# Patient Record
Sex: MALE | Race: WHITE | NOT HISPANIC OR LATINO | Employment: FULL TIME | URBAN - METROPOLITAN AREA
[De-identification: names, ages, dates, MRNs, and addresses within clinical notes are randomized per-mention and may not be internally consistent; named-entity substitution may affect disease eponyms.]

---

## 2021-04-20 ENCOUNTER — OCCMED (OUTPATIENT)
Dept: URGENT CARE | Facility: CLINIC | Age: 21
End: 2021-04-20
Payer: OTHER MISCELLANEOUS

## 2021-04-20 ENCOUNTER — APPOINTMENT (OUTPATIENT)
Dept: RADIOLOGY | Facility: CLINIC | Age: 21
End: 2021-04-20
Payer: OTHER MISCELLANEOUS

## 2021-04-20 DIAGNOSIS — M25.572 ACUTE LEFT ANKLE PAIN: Primary | ICD-10-CM

## 2021-04-20 DIAGNOSIS — M25.572 ACUTE LEFT ANKLE PAIN: ICD-10-CM

## 2021-04-20 PROCEDURE — 73630 X-RAY EXAM OF FOOT: CPT

## 2021-04-20 PROCEDURE — 99283 EMERGENCY DEPT VISIT LOW MDM: CPT | Performed by: FAMILY MEDICINE

## 2021-04-20 PROCEDURE — 73610 X-RAY EXAM OF ANKLE: CPT

## 2021-04-20 PROCEDURE — G0382 LEV 3 HOSP TYPE B ED VISIT: HCPCS | Performed by: FAMILY MEDICINE

## 2022-02-02 ENCOUNTER — OFFICE VISIT (OUTPATIENT)
Dept: URGENT CARE | Facility: CLINIC | Age: 22
End: 2022-02-02
Payer: COMMERCIAL

## 2022-02-02 VITALS
DIASTOLIC BLOOD PRESSURE: 74 MMHG | HEART RATE: 99 BPM | OXYGEN SATURATION: 97 % | SYSTOLIC BLOOD PRESSURE: 125 MMHG | WEIGHT: 257 LBS | RESPIRATION RATE: 16 BRPM

## 2022-02-02 DIAGNOSIS — M62.830 LUMBAR PARASPINAL MUSCLE SPASM: Primary | ICD-10-CM

## 2022-02-02 PROCEDURE — G0382 LEV 3 HOSP TYPE B ED VISIT: HCPCS | Performed by: FAMILY MEDICINE

## 2022-02-02 RX ORDER — ESTRADIOL 2 MG/1
2 TABLET ORAL
COMMUNITY
Start: 2022-01-26

## 2022-02-02 RX ORDER — SPIRONOLACTONE 50 MG/1
50 TABLET, FILM COATED ORAL 2 TIMES DAILY
COMMUNITY
Start: 2022-01-26

## 2022-02-02 RX ORDER — CYCLOBENZAPRINE HCL 10 MG
10 TABLET ORAL 3 TIMES DAILY PRN
Qty: 30 TABLET | Refills: 0 | Status: SHIPPED | OUTPATIENT
Start: 2022-02-02 | End: 2022-03-22 | Stop reason: HOSPADM

## 2022-02-02 RX ORDER — MELOXICAM 7.5 MG/1
7.5 TABLET ORAL DAILY
Qty: 60 TABLET | Refills: 0 | Status: SHIPPED | OUTPATIENT
Start: 2022-02-02 | End: 2022-03-22 | Stop reason: HOSPADM

## 2022-02-02 RX ORDER — PROGESTERONE 100 MG/1
1 CAPSULE ORAL EVERY EVENING
COMMUNITY
Start: 2022-01-26

## 2022-02-02 NOTE — PROGRESS NOTES
[facility]        NAME: Ursula Marin is a 25 y o  male  : 2000    MRN: [id number]  DATE: 2022  TIME: 6:33 PM    Assessment and Plan   Lumbar paraspinal muscle spasm [M62 830]  1  Lumbar paraspinal muscle spasm  cyclobenzaprine (FLEXERIL) 10 mg tablet    meloxicam (MOBIC) 7 5 mg tablet         Patient Instructions       Follow up with PCP in 3-5 days  Proceed to  ER if symptoms worsen  Chief Complaint     Chief Complaint   Patient presents with    Fall     Pt fell 5-6 wooden stairs  Denies head injury  Pt c/o L thigh pain with lump at impact site  Bilateral lower back, R wrist and L knee pain  History of Present Illness       57-year-old male presenting for evaluation of multiple injuries  He reports slipping on the stairs last evening falling down approximately 5 steps  He reports hitting his lower back, right thigh and left knee on the steps  He states that 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 was experiencing worsening pain symptoms specifically in the back with bending forward and also in his left knee with prolonged standing  Ibuprofen has provided no relief  Denies any joint swelling, warmth or crepitus  Denies any extremity weakness, numbness or tingling  Review of Systems   Review of Systems   Constitutional: Negative  HENT: Negative  Eyes: Negative  Respiratory: Negative  Cardiovascular: Negative  Gastrointestinal: Negative  Endocrine: Negative  Musculoskeletal: Positive for arthralgias  Allergic/Immunologic: Negative  Neurological: Negative  Negative for headaches  Hematological: Negative  All other systems reviewed and are negative          Current Medications       Current Outpatient Medications:     cyclobenzaprine (FLEXERIL) 10 mg tablet, Take 1 tablet (10 mg total) by mouth 3 (three) times a day as needed for muscle spasms, Disp: 30 tablet, Rfl: 0    estradiol (ESTRACE) 2 MG tablet, 2 mg Pt taking 4mg BID , Disp: , Rfl:     meloxicam (MOBIC) 7 5 mg tablet, Take 1 tablet (7 5 mg total) by mouth daily, Disp: 60 tablet, Rfl: 0    Progesterone 100 MG CAPS, Take 1 capsule by mouth every evening, Disp: , Rfl:     spironolactone (ALDACTONE) 50 mg tablet, Take 50 mg by mouth 2 (two) times a day, Disp: , Rfl:     Current Allergies     Allergies as of 02/02/2022    (No Known Allergies)            The following portions of the patient's history were reviewed and updated as appropriate: allergies, current medications, past family history, past medical history, past social history, past surgical history and problem list      History reviewed  No pertinent past medical history  History reviewed  No pertinent surgical history  History reviewed  No pertinent family history  Medications have been verified  Objective   /74   Pulse 99   Resp 16   Wt 117 kg (257 lb)   SpO2 97%   No LMP for male patient  Physical Exam     Physical Exam  Constitutional:       Appearance: He is well-developed  HENT:      Head: Normocephalic  Mouth/Throat:      Mouth: Mucous membranes are moist    Eyes:      Pupils: Pupils are equal, round, and reactive to light  Pulmonary:      Effort: Pulmonary effort is normal    Abdominal:      General: Abdomen is flat  Musculoskeletal:         General: Tenderness present  No swelling or deformity  Normal range of motion  Cervical back: Normal range of motion  Right lower leg: No edema  Left lower leg: No edema  Skin:     General: Skin is warm  Neurological:      General: No focal deficit present  Mental Status: He is alert

## 2022-03-06 ENCOUNTER — HOSPITAL ENCOUNTER (EMERGENCY)
Facility: HOSPITAL | Age: 22
Discharge: HOME | End: 2022-03-06
Attending: EMERGENCY MEDICINE | Admitting: EMERGENCY MEDICINE
Payer: COMMERCIAL

## 2022-03-06 VITALS
DIASTOLIC BLOOD PRESSURE: 85 MMHG | SYSTOLIC BLOOD PRESSURE: 139 MMHG | RESPIRATION RATE: 18 BRPM | HEART RATE: 107 BPM | TEMPERATURE: 98.1 F | OXYGEN SATURATION: 98 %

## 2022-03-06 DIAGNOSIS — F32.A DEPRESSION: Primary | ICD-10-CM

## 2022-03-06 LAB
ETHANOL EXG-MCNC: 0 MG/DL
FLUAV RNA RESP QL NAA+PROBE: NEGATIVE
FLUBV RNA RESP QL NAA+PROBE: NEGATIVE
RSV RNA RESP QL NAA+PROBE: NEGATIVE
SARS-COV-2 RNA RESP QL NAA+PROBE: NEGATIVE

## 2022-03-06 PROCEDURE — 0241U HB NFCT DS VIR RESP RNA 4 TRGT: CPT | Performed by: PHYSICIAN ASSISTANT

## 2022-03-06 PROCEDURE — 82075 ASSAY OF BREATH ETHANOL: CPT | Performed by: PHYSICIAN ASSISTANT

## 2022-03-06 PROCEDURE — 99284 EMERGENCY DEPT VISIT MOD MDM: CPT

## 2022-03-06 PROCEDURE — 99284 EMERGENCY DEPT VISIT MOD MDM: CPT | Performed by: PHYSICIAN ASSISTANT

## 2022-03-06 NOTE — ED NOTES
This writer discussed the patients current presentation and recommended discharge plan with Ladi Allison  She agrees with the patient being discharged at this time with referrals and/or information about Individual Outpatient Program      The patient was Instructed to follow up with their PCP  The patient was provided with referral information for: [facility] at [phone]  This writer and the patient completed a safety plan  The patient was provided with a copy of their safety plan with encouragement to utilize the plan following discharge  This writer discussed discharge plans with the patient, who agrees with and understands the discharge plans           SAFETY PLAN  Warning Signs (thoughts, images, mood, behavior, situations) of a potential crisis: isolate self in room      Coping Skills (what can I do to take my mind off the problem, or to keep myself safe): call girlfriend, go for a walk      Outside Support (who can I reach out to for support and help): Call Crisis Hotline, Call Therapist        National Suicide Prevention Hotline:  9-375.122.4931    [facility] [address]: [facility]: [place] [address] [place] [address] [address] [phone] - Crisis   [phone] - Peer Support Talk Line (1-9pm daily)  [phone] - Teen Support Talk Line (1-9pm daily)  [address] [facility] [phone] - [address]

## 2022-03-06 NOTE — ED NOTES
Crisis met with pt to complete Crisis Intake and Safety Risk Assessment  Introduce self, role, and evaluation process  Pt presents in ED via ambulance for psyche evaluation  He reports that his mother told his therapist that he broke into a safe to get a gun to kill himself today  His  therapist called the police  Pt states that he  does not have a mental health diagnosis but often times feels depress  He has a history of cutting with the last cutting a little over a year ago  Pt states at times he struggles tog et out of bad, and struggles to go to work  He states she has been depressed over his transition and stress from life  He states she has had multiple previous threats and attempts in the past   Pt currently denies SI/HI/AVH or thoughts to self harm    Patient does prefer to be referred to as she

## 2022-03-06 NOTE — ED PROVIDER NOTES
History  Chief Complaint   Patient presents with    Psychiatric Evaluation     pt reports to er via fiharvey and police  has been dealing with increased depression over the past couple months  states that she inherited a gun from past grandfather and was locked in a safe at her parents house, today broke into the safe and got the gun with intent to hurt herself, mother called therapist at which point therapist callled police  oswaldo states that the "siutation was handled" and they did not need to call poice  Patient is a 26 y/o M with h/o depression that presents to the ED with SI with a plan  Patient broke into a safe to get a gun to kill himself today  Her therapist called the police  Family angry because they said they had the situation under control  SHe states she no longer feels suicidal   Maryamherlinda Marcum states she has been depressed over his transition and stress from life  SHe states she has had multiple previous threats and attempts in the past   Maryam Trixie does not currently have a psychiatrist, but has a therapist, but states after today is no longer seeing that therapist   Maryam Marcum has never been inpatient before  Patient does prefer to be referred to as she  She no longer feels suicidal        History provided by:  Patient  Psychiatric Evaluation  Presenting symptoms: depression, suicidal thoughts and suicide attempt    Presenting symptoms: no homicidal ideas    Patient accompanied by: oswaldo  Degree of incapacity (severity):   Moderate  Onset quality:  Gradual  Timing:  Constant  Progression:  Worsening  Chronicity:  Chronic  Context: stressful life event    Context: not alcohol use and not drug abuse    Relieved by:  Nothing  Worsened by:  Nothing  Ineffective treatments:  None tried  Associated symptoms: anxiety and poor judgment    Associated symptoms: no abdominal pain and no chest pain    Risk factors: hx of mental illness        Prior to Admission Medications   Prescriptions Last Dose Informant Patient Reported? Taking? Progesterone 100 MG CAPS Past Week at Unknown time  Yes Yes   Sig: Take 1 capsule by mouth every evening   cyclobenzaprine (FLEXERIL) 10 mg tablet Not Taking at Unknown time  No No   Sig: Take 1 tablet (10 mg total) by mouth 3 (three) times a day as needed for muscle spasms   Patient not taking: Reported on 3/6/2022    estradiol (ESTRACE) 2 MG tablet Past Week at Unknown time  Yes Yes   Si mg Pt taking 4mg BID    meloxicam (MOBIC) 7 5 mg tablet Not Taking at Unknown time  No No   Sig: Take 1 tablet (7 5 mg total) by mouth daily   Patient not taking: Reported on 3/6/2022    spironolactone (ALDACTONE) 50 mg tablet Past Week at Unknown time  Yes Yes   Sig: Take 50 mg by mouth 2 (two) times a day      Facility-Administered Medications: None       Past Medical History:   Diagnosis Date    Depression        History reviewed  No pertinent surgical history  History reviewed  No pertinent family history  I have reviewed and agree with the history as documented  E-Cigarette/Vaping     E-Cigarette/Vaping Substances     Social History     Tobacco Use    Smoking status: Never Smoker    Smokeless tobacco: Never Used   Substance Use Topics    Alcohol use: Not Currently    Drug use: Not Currently       Review of Systems   Constitutional: Negative for chills and fever  HENT: Negative  Respiratory: Negative for cough and shortness of breath  Cardiovascular: Negative for chest pain and leg swelling  Gastrointestinal: Negative for abdominal pain, diarrhea, nausea and vomiting  Skin: Negative for color change and rash  Neurological: Negative for dizziness, weakness and numbness  Psychiatric/Behavioral: Positive for suicidal ideas  Negative for homicidal ideas  The patient is nervous/anxious  All other systems reviewed and are negative  Physical Exam  Physical Exam  Vitals and nursing note reviewed  Constitutional:       General: He is not in acute distress       Appearance: Normal appearance  He is well-developed, well-groomed and overweight  He is not ill-appearing or diaphoretic  HENT:      Head: Normocephalic and atraumatic  Right Ear: External ear normal       Left Ear: External ear normal       Nose: Nose normal    Eyes:      Conjunctiva/sclera: Conjunctivae normal       Pupils: Pupils are equal    Cardiovascular:      Rate and Rhythm: Regular rhythm  Tachycardia present  Heart sounds: Normal heart sounds  Pulmonary:      Effort: Pulmonary effort is normal       Breath sounds: Normal breath sounds  No wheezing, rhonchi or rales  Musculoskeletal:         General: Normal range of motion  Cervical back: Normal range of motion  Skin:     General: Skin is warm and dry  Coloration: Skin is not jaundiced or pale  Findings: No rash  Neurological:      General: No focal deficit present  Mental Status: He is alert and oriented to person, place, and time  Motor: No weakness  Psychiatric:         Mood and Affect: Mood is anxious  Speech: Speech normal          Behavior: Behavior is cooperative  Thought Content: Thought content includes suicidal ideation  Thought content does not include homicidal ideation  Thought content includes suicidal plan        Comments: Poor eye contact         Vital Signs  ED Triage Vitals   Temperature Pulse Respirations Blood Pressure SpO2   03/06/22 1515 03/06/22 1512 03/06/22 1515 03/06/22 1512 03/06/22 1512   98 1 °F (36 7 °C) (!) 107 18 139/85 98 %      Temp Source Heart Rate Source Patient Position - Orthostatic VS BP Location FiO2 (%)   03/06/22 1515 -- -- -- --   Temporal          Pain Score       --                  Vitals:    03/06/22 1512   BP: 139/85   Pulse: (!) 107         Visual Acuity      ED Medications  Medications - No data to display    Diagnostic Studies  Results Reviewed     Procedure Component Value Units Date/Time    COVID/FLU/RSV - 2 hour TAT [[id number]]  (Normal) Collected: 03/06/22 1611    Lab Status: Final result Specimen: Nares from Nose Updated: 03/06/22 1712     SARS-CoV-2 Negative     INFLUENZA A PCR Negative     INFLUENZA B PCR Negative     RSV PCR Negative    Narrative:      FOR PEDIATRIC PATIENTS - copy/paste COVID Guidelines URL to browser: [email or web address]    SARS-CoV-2 assay is a Nucleic Acid Amplification assay intended for the  qualitative detection of nucleic acid from SARS-CoV-2 in nasopharyngeal  swabs  Results are for the presumptive identification of SARS-CoV-2 RNA  Positive results are indicative of infection with SARS-CoV-2, the virus  causing COVID-19, but do not rule out bacterial infection or co-infection  with other viruses  Laboratories within the [place] and its  territories are required to report all positive results to the appropriate  public health authorities  Negative results do not preclude SARS-CoV-2  infection and should not be used as the sole basis for treatment or other  patient management decisions  Negative results must be combined with  clinical observations, patient history, and epidemiological information  This test has not been FDA cleared or approved  This test has been authorized by FDA under an Emergency Use Authorization  (EUA)  This test is only authorized for the duration of time the  declaration that circumstances exist justifying the authorization of the  emergency use of an in vitro diagnostic tests for detection of SARS-CoV-2  virus and/or diagnosis of COVID-19 infection under section 564(b)(1) of  the Act, 21 U  S C  533PAT-2(U)(7), unless the authorization is terminated  or revoked sooner  The test has been validated but independent review by FDA  and CLIA is pending  Test performed using Card[employer] GeneXpert: This RT-PCR assay targets N2,  a region unique to SARS-CoV-2   A conserved region in the E-gene was chosen  for pan-Sarbecovirus detection which includes SARS-CoV-2  POCT alcohol breath test [[id number]]  (Normal) Resulted: 03/06/22 1610    Lab Status: Final result Updated: 03/06/22 1611     EXTBreath Alcohol 0 000    Rapid drug screen, urine [[id number]]     Lab Status: No result Specimen: Urine                  No orders to display              Procedures  Procedures         ED Course  ED Course as of 03/06/22 1712   Sun Mar 06, 2022   1645 Patient evaluated by crisis  He is still denying SI/HI  He declines inpatient treatment  MDM  Number of Diagnoses or Management Options  Depression: new and requires workup  Diagnosis management comments: Patient with depression, h/o SI, currently denies SI, will consult crisis  Patient continues to deny SI, she has had multiple threats in the past, never required inpatient treatment  Veronica feels comfortable taking patient home and states she has secured gun and knives  Patient given f/u numbers and states she will most likely go to [facility]  Amount and/or Complexity of Data Reviewed  Clinical lab tests: ordered and reviewed  Discuss the patient with other providers: yes (Tracie Siu from crisis  )    Patient Progress  Patient progress: stable      Disposition  Final diagnoses:   Depression     Time reflects when diagnosis was documented in both MDM as applicable and the Disposition within this note     Time User Action Codes Description Comment    3/6/2022  4:11 PM Boni Peers Add [O14  A] Depression       ED Disposition     ED Disposition Condition Date/Time Comment    Discharge Stable [place] Mar 6, 2022  5:04 PM Cally Khan is stable for discharge  Follow-up Information     Follow up With Specialties Details Why [address] [facility], DO  Call in 1 day For recheck [facility]    [address]            Patient's Medications   Discharge Prescriptions    No medications on file       No discharge procedures on file      PDMP Review     None          ED Provider  Electronically Signed by           Malcolm Holly PA-C  03/06/22 2685

## 2022-03-16 ENCOUNTER — HOSPITAL ENCOUNTER (EMERGENCY)
Facility: HOSPITAL | Age: 22
Discharge: NURSING, REHAB OR LONG-TERM CARE | End: 2022-03-17
Attending: EMERGENCY MEDICINE | Admitting: EMERGENCY MEDICINE
Payer: COMMERCIAL

## 2022-03-16 DIAGNOSIS — Z00.8 MEDICAL CLEARANCE FOR PSYCHIATRIC ADMISSION: ICD-10-CM

## 2022-03-16 DIAGNOSIS — F32.A DEPRESSION: ICD-10-CM

## 2022-03-16 DIAGNOSIS — R45.851 SUICIDAL IDEATION: Primary | ICD-10-CM

## 2022-03-16 PROCEDURE — 0241U HB NFCT DS VIR RESP RNA 4 TRGT: CPT | Performed by: EMERGENCY MEDICINE

## 2022-03-16 PROCEDURE — 99285 EMERGENCY DEPT VISIT HI MDM: CPT | Performed by: EMERGENCY MEDICINE

## 2022-03-16 PROCEDURE — 82075 ASSAY OF BREATH ETHANOL: CPT | Performed by: EMERGENCY MEDICINE

## 2022-03-16 PROCEDURE — 99285 EMERGENCY DEPT VISIT HI MDM: CPT

## 2022-03-17 ENCOUNTER — HOSPITAL ENCOUNTER (INPATIENT)
Facility: HOSPITAL | Age: 22
LOS: 5 days | Discharge: HOME | DRG: 885 | End: 2022-03-22
Attending: STUDENT IN AN ORGANIZED HEALTH CARE EDUCATION/TRAINING PROGRAM | Admitting: STUDENT IN AN ORGANIZED HEALTH CARE EDUCATION/TRAINING PROGRAM
Payer: COMMERCIAL

## 2022-03-17 VITALS
TEMPERATURE: 97.8 F | RESPIRATION RATE: 16 BRPM | SYSTOLIC BLOOD PRESSURE: 122 MMHG | WEIGHT: 260 LBS | DIASTOLIC BLOOD PRESSURE: 72 MMHG | OXYGEN SATURATION: 97 % | HEART RATE: 80 BPM | BODY MASS INDEX: 36.4 KG/M2 | HEIGHT: 71 IN

## 2022-03-17 DIAGNOSIS — F33.9 RECURRENT MAJOR DEPRESSIVE DISORDER (HCC): Primary | ICD-10-CM

## 2022-03-17 DIAGNOSIS — Z00.8 MEDICAL CLEARANCE FOR PSYCHIATRIC ADMISSION: ICD-10-CM

## 2022-03-17 LAB
AMPHETAMINES SERPL QL SCN: NEGATIVE
BARBITURATES UR QL: NEGATIVE
BENZODIAZ UR QL: NEGATIVE
COCAINE UR QL: NEGATIVE
METHADONE UR QL: NEGATIVE
OPIATES UR QL SCN: NEGATIVE
OXYCODONE+OXYMORPHONE UR QL SCN: NEGATIVE
PCP UR QL: NEGATIVE
THC UR QL: NEGATIVE

## 2022-03-17 PROCEDURE — 80307 DRUG TEST PRSMV CHEM ANLYZR: CPT | Performed by: EMERGENCY MEDICINE

## 2022-03-17 RX ORDER — TRAZODONE HYDROCHLORIDE 50 MG/1
50 TABLET ORAL
Status: DISCONTINUED | OUTPATIENT
Start: 2022-03-17 | End: 2022-03-22 | Stop reason: HOSPADM

## 2022-03-17 RX ORDER — ACETAMINOPHEN 325 MG/1
650 TABLET ORAL EVERY 6 HOURS PRN
Status: CANCELLED | OUTPATIENT
Start: 2022-03-17

## 2022-03-17 RX ORDER — BENZTROPINE MESYLATE 1 MG/ML
1 INJECTION INTRAMUSCULAR; INTRAVENOUS
Status: CANCELLED | OUTPATIENT
Start: 2022-03-17

## 2022-03-17 RX ORDER — HYDROXYZINE 50 MG/1
50 TABLET, FILM COATED ORAL
Status: DISCONTINUED | OUTPATIENT
Start: 2022-03-17 | End: 2022-03-22 | Stop reason: HOSPADM

## 2022-03-17 RX ORDER — OLANZAPINE 10 MG/1
5 INJECTION, POWDER, LYOPHILIZED, FOR SOLUTION INTRAMUSCULAR
Status: DISCONTINUED | OUTPATIENT
Start: 2022-03-17 | End: 2022-03-22 | Stop reason: HOSPADM

## 2022-03-17 RX ORDER — AMOXICILLIN 250 MG
1 CAPSULE ORAL DAILY PRN
Status: DISCONTINUED | OUTPATIENT
Start: 2022-03-17 | End: 2022-03-22 | Stop reason: HOSPADM

## 2022-03-17 RX ORDER — PROGESTERONE 100 MG/1
100 CAPSULE ORAL EVERY EVENING
Status: DISCONTINUED | OUTPATIENT
Start: 2022-03-17 | End: 2022-03-17

## 2022-03-17 RX ORDER — LORAZEPAM 2 MG/ML
2 INJECTION INTRAMUSCULAR EVERY 6 HOURS PRN
Status: CANCELLED | OUTPATIENT
Start: 2022-03-17

## 2022-03-17 RX ORDER — SPIRONOLACTONE 25 MG/1
50 TABLET ORAL 2 TIMES DAILY
Status: DISCONTINUED | OUTPATIENT
Start: 2022-03-17 | End: 2022-03-17 | Stop reason: HOSPADM

## 2022-03-17 RX ORDER — OLANZAPINE 10 MG/1
10 INJECTION, POWDER, LYOPHILIZED, FOR SOLUTION INTRAMUSCULAR
Status: DISCONTINUED | OUTPATIENT
Start: 2022-03-17 | End: 2022-03-22 | Stop reason: HOSPADM

## 2022-03-17 RX ORDER — LORAZEPAM 2 MG/ML
2 INJECTION INTRAMUSCULAR EVERY 6 HOURS PRN
Status: DISCONTINUED | OUTPATIENT
Start: 2022-03-17 | End: 2022-03-22 | Stop reason: HOSPADM

## 2022-03-17 RX ORDER — OLANZAPINE 2.5 MG/1
10 TABLET ORAL
Status: CANCELLED | OUTPATIENT
Start: 2022-03-17

## 2022-03-17 RX ORDER — HYDROXYZINE HYDROCHLORIDE 25 MG/1
25 TABLET, FILM COATED ORAL
Status: DISCONTINUED | OUTPATIENT
Start: 2022-03-17 | End: 2022-03-22 | Stop reason: HOSPADM

## 2022-03-17 RX ORDER — PROGESTERONE 100 MG/1
100 CAPSULE ORAL EVERY EVENING
Status: CANCELLED | OUTPATIENT
Start: 2022-03-17

## 2022-03-17 RX ORDER — OLANZAPINE 5 MG/1
5 TABLET ORAL
Status: DISCONTINUED | OUTPATIENT
Start: 2022-03-17 | End: 2022-03-22 | Stop reason: HOSPADM

## 2022-03-17 RX ORDER — POLYETHYLENE GLYCOL 3350 17 G/17G
17 POWDER, FOR SOLUTION ORAL DAILY PRN
Status: CANCELLED | OUTPATIENT
Start: 2022-03-17

## 2022-03-17 RX ORDER — HYDROXYZINE HYDROCHLORIDE 25 MG/1
25 TABLET, FILM COATED ORAL
Status: CANCELLED | OUTPATIENT
Start: 2022-03-17

## 2022-03-17 RX ORDER — BISACODYL 10 MG
10 SUPPOSITORY, RECTAL RECTAL DAILY PRN
Status: CANCELLED | OUTPATIENT
Start: 2022-03-17

## 2022-03-17 RX ORDER — AMOXICILLIN 250 MG
1 CAPSULE ORAL DAILY PRN
Status: CANCELLED | OUTPATIENT
Start: 2022-03-17

## 2022-03-17 RX ORDER — MINERAL OIL AND PETROLATUM 150; 830 MG/G; MG/G
1 OINTMENT OPHTHALMIC
Status: DISCONTINUED | OUTPATIENT
Start: 2022-03-17 | End: 2022-03-22 | Stop reason: HOSPADM

## 2022-03-17 RX ORDER — SPIRONOLACTONE 25 MG/1
50 TABLET ORAL 2 TIMES DAILY
Status: DISCONTINUED | OUTPATIENT
Start: 2022-03-17 | End: 2022-03-22 | Stop reason: HOSPADM

## 2022-03-17 RX ORDER — SPIRONOLACTONE 25 MG/1
50 TABLET ORAL 2 TIMES DAILY
Status: CANCELLED | OUTPATIENT
Start: 2022-03-17

## 2022-03-17 RX ORDER — ACETAMINOPHEN 325 MG/1
650 TABLET ORAL EVERY 4 HOURS PRN
Status: CANCELLED | OUTPATIENT
Start: 2022-03-17

## 2022-03-17 RX ORDER — SPIRONOLACTONE 25 MG/1
50 TABLET ORAL DAILY
Status: DISCONTINUED | OUTPATIENT
Start: 2022-03-17 | End: 2022-03-17

## 2022-03-17 RX ORDER — OLANZAPINE 10 MG/1
10 INJECTION, POWDER, LYOPHILIZED, FOR SOLUTION INTRAMUSCULAR
Status: CANCELLED | OUTPATIENT
Start: 2022-03-17

## 2022-03-17 RX ORDER — BENZTROPINE MESYLATE 1 MG/1
1 TABLET ORAL
Status: DISCONTINUED | OUTPATIENT
Start: 2022-03-17 | End: 2022-03-22 | Stop reason: HOSPADM

## 2022-03-17 RX ORDER — TRAZODONE HYDROCHLORIDE 50 MG/1
50 TABLET ORAL
Status: CANCELLED | OUTPATIENT
Start: 2022-03-17

## 2022-03-17 RX ORDER — OLANZAPINE 2.5 MG/1
2.5 TABLET ORAL
Status: CANCELLED | OUTPATIENT
Start: 2022-03-17

## 2022-03-17 RX ORDER — BISACODYL 10 MG
10 SUPPOSITORY, RECTAL RECTAL DAILY PRN
Status: DISCONTINUED | OUTPATIENT
Start: 2022-03-17 | End: 2022-03-22 | Stop reason: HOSPADM

## 2022-03-17 RX ORDER — HYDROXYZINE HYDROCHLORIDE 25 MG/1
100 TABLET, FILM COATED ORAL
Status: CANCELLED | OUTPATIENT
Start: 2022-03-17

## 2022-03-17 RX ORDER — OLANZAPINE 10 MG/1
10 TABLET ORAL
Status: DISCONTINUED | OUTPATIENT
Start: 2022-03-17 | End: 2022-03-22 | Stop reason: HOSPADM

## 2022-03-17 RX ORDER — HYDROXYZINE HYDROCHLORIDE 25 MG/1
50 TABLET, FILM COATED ORAL
Status: CANCELLED | OUTPATIENT
Start: 2022-03-17

## 2022-03-17 RX ORDER — MINERAL OIL AND PETROLATUM 150; 830 MG/G; MG/G
1 OINTMENT OPHTHALMIC
Status: CANCELLED | OUTPATIENT
Start: 2022-03-17

## 2022-03-17 RX ORDER — DIPHENHYDRAMINE HYDROCHLORIDE 50 MG/ML
50 INJECTION INTRAMUSCULAR; INTRAVENOUS EVERY 6 HOURS PRN
Status: CANCELLED | OUTPATIENT
Start: 2022-03-17

## 2022-03-17 RX ORDER — ACETAMINOPHEN 325 MG/1
650 TABLET ORAL EVERY 4 HOURS PRN
Status: DISCONTINUED | OUTPATIENT
Start: 2022-03-17 | End: 2022-03-22 | Stop reason: HOSPADM

## 2022-03-17 RX ORDER — PROGESTERONE 100 MG/1
100 CAPSULE ORAL EVERY EVENING
Status: DISCONTINUED | OUTPATIENT
Start: 2022-03-17 | End: 2022-03-17 | Stop reason: HOSPADM

## 2022-03-17 RX ORDER — ESTRADIOL 2 MG/1
2 TABLET ORAL 2 TIMES DAILY
Status: DISCONTINUED | OUTPATIENT
Start: 2022-03-17 | End: 2022-03-17 | Stop reason: HOSPADM

## 2022-03-17 RX ORDER — BENZTROPINE MESYLATE 1 MG/ML
1 INJECTION INTRAMUSCULAR; INTRAVENOUS
Status: DISCONTINUED | OUTPATIENT
Start: 2022-03-17 | End: 2022-03-22 | Stop reason: HOSPADM

## 2022-03-17 RX ORDER — ACETAMINOPHEN 325 MG/1
650 TABLET ORAL EVERY 6 HOURS PRN
Status: DISCONTINUED | OUTPATIENT
Start: 2022-03-17 | End: 2022-03-22 | Stop reason: HOSPADM

## 2022-03-17 RX ORDER — BENZTROPINE MESYLATE 0.5 MG/1
1 TABLET ORAL
Status: CANCELLED | OUTPATIENT
Start: 2022-03-17

## 2022-03-17 RX ORDER — PROGESTERONE 100 MG/1
100 CAPSULE ORAL EVERY EVENING
Status: DISCONTINUED | OUTPATIENT
Start: 2022-03-18 | End: 2022-03-22 | Stop reason: HOSPADM

## 2022-03-17 RX ORDER — POLYETHYLENE GLYCOL 3350 17 G/17G
17 POWDER, FOR SOLUTION ORAL DAILY PRN
Status: DISCONTINUED | OUTPATIENT
Start: 2022-03-17 | End: 2022-03-22 | Stop reason: HOSPADM

## 2022-03-17 RX ORDER — ESTRADIOL 1 MG/1
2 TABLET ORAL 2 TIMES DAILY
Status: CANCELLED | OUTPATIENT
Start: 2022-03-17

## 2022-03-17 RX ORDER — ACETAMINOPHEN 325 MG/1
975 TABLET ORAL EVERY 6 HOURS PRN
Status: DISCONTINUED | OUTPATIENT
Start: 2022-03-17 | End: 2022-03-22 | Stop reason: HOSPADM

## 2022-03-17 RX ORDER — DIPHENHYDRAMINE HYDROCHLORIDE 50 MG/ML
50 INJECTION INTRAMUSCULAR; INTRAVENOUS EVERY 6 HOURS PRN
Status: DISCONTINUED | OUTPATIENT
Start: 2022-03-17 | End: 2022-03-22 | Stop reason: HOSPADM

## 2022-03-17 RX ORDER — ACETAMINOPHEN 325 MG/1
975 TABLET ORAL EVERY 6 HOURS PRN
Status: CANCELLED | OUTPATIENT
Start: 2022-03-17

## 2022-03-17 RX ORDER — OLANZAPINE 2.5 MG/1
2.5 TABLET ORAL
Status: DISCONTINUED | OUTPATIENT
Start: 2022-03-17 | End: 2022-03-22 | Stop reason: HOSPADM

## 2022-03-17 RX ORDER — MAGNESIUM HYDROXIDE/ALUMINUM HYDROXICE/SIMETHICONE 120; 1200; 1200 MG/30ML; MG/30ML; MG/30ML
30 SUSPENSION ORAL EVERY 4 HOURS PRN
Status: DISCONTINUED | OUTPATIENT
Start: 2022-03-17 | End: 2022-03-22 | Stop reason: HOSPADM

## 2022-03-17 RX ORDER — OLANZAPINE 10 MG/1
5 INJECTION, POWDER, LYOPHILIZED, FOR SOLUTION INTRAMUSCULAR
Status: CANCELLED | OUTPATIENT
Start: 2022-03-17

## 2022-03-17 RX ORDER — HYDROXYZINE 50 MG/1
100 TABLET, FILM COATED ORAL
Status: DISCONTINUED | OUTPATIENT
Start: 2022-03-17 | End: 2022-03-22 | Stop reason: HOSPADM

## 2022-03-17 RX ORDER — OLANZAPINE 2.5 MG/1
5 TABLET ORAL
Status: CANCELLED | OUTPATIENT
Start: 2022-03-17

## 2022-03-17 RX ORDER — MAGNESIUM HYDROXIDE/ALUMINUM HYDROXICE/SIMETHICONE 120; 1200; 1200 MG/30ML; MG/30ML; MG/30ML
30 SUSPENSION ORAL EVERY 4 HOURS PRN
Status: CANCELLED | OUTPATIENT
Start: 2022-03-17

## 2022-03-17 RX ORDER — ESTRADIOL 1 MG/1
2 TABLET ORAL 2 TIMES DAILY
Status: DISCONTINUED | OUTPATIENT
Start: 2022-03-17 | End: 2022-03-18

## 2022-03-17 RX ADMIN — SPIRONOLACTONE 50 MG: 25 TABLET ORAL at 21:58

## 2022-03-17 RX ADMIN — TRAZODONE HYDROCHLORIDE 50 MG: 50 TABLET ORAL at 22:00

## 2022-03-17 RX ADMIN — SPIRONOLACTONE 50 MG: 25 TABLET ORAL at 09:50

## 2022-03-17 RX ADMIN — ESTRADIOL 2 MG: 2 TABLET ORAL at 09:44

## 2022-03-17 NOTE — ED NOTES
Sent to intake     Bed search  [place] [facility]): No bed  [facility] Bu[facility] Samra Mitchell): no bed  [facility] Bal[facility]ine ([place]): No bed  [place] (Yann Rashid): No bed  [facility]): No beds  [facility] Kol[facility]): No beds  [place] Jolene Brown): clinical faxed  [facility] ([id number]): clinical faxed      Uli Nelson Liv Neighbor): clinical faxed  C[facility] Carlton): no bed  [place] Melissa Hart): Clinical faxed

## 2022-03-17 NOTE — EMTALA/ACUTE CARE TRANSFER
[facility] EMERGENCY DEPARTMENT  [address]  [place] [address] [address]  Dept: [phone]      CNVJHI TRANSFER CONSENT    NAME Rafa Faith                                         2000                              MRN [id number]    I have been informed of my rights regarding examination, treatment, and transfer   by Dr Justino Martinez att  providers found    Benefits: Specialized equipment and/or services available at the receiving facility (Include comment)________________________    Risks: Potential for delay in receiving treatment      Consent for Transfer:  I acknowledge that my medical condition has been evaluated and explained to me by the emergency department physician or other qualified medical person and/or my attending physician, who has recommended that I be transferred to the service of  Accepting Physician: Dr Cullen Dewey at [address] Name, [address] : Mal Portillo  The above potential benefits of such transfer, the potential risks associated with such transfer, and the probable risks of not being transferred have been explained to me, and I fully understand them  The doctor has explained that, in my case, the benefits of transfer outweigh the risks  I agree to be transferred  I authorize the performance of emergency medical procedures and treatments upon me in both transit and upon arrival at the receiving facility  Additionally, I authorize the release of any and all medical records to the receiving facility and request they be transported with me, if possible  I understand that the safest mode of transportation during a medical emergency is an ambulance and that the Hospital advocates the use of this mode of transport  Risks of traveling to the receiving facility by car, including absence of medical control, life sustaining equipment, such as oxygen, and medical personnel has been explained to me and I fully understand them      ([address])  [  ]  I consent to the stated transfer and to be transported by ambulance/helicopter  [  ]  I consent to the stated transfer, but refuse transportation by ambulance and accept full responsibility for my transportation by car  I understand the risks of non-ambulance transfers and I exonerate the Hospital and its staff from any deterioration in my condition that results from this refusal     X___________________________________________    DATE  22  TIME________  Signature of patient or legally responsible individual signing on patient behalf           RELATIONSHIP TO PATIENT_________________________          Provider Certification    NAME Kay Palma                                        [facility] 2000                              MRN [id number]    A medical screening exam was performed on the above named patient  Based on the examination:    Condition Necessitating Transfer The primary encounter diagnosis was Suicidal ideation  Diagnoses of Depression and Medical clearance for psychiatric admission were also pertinent to this visit      Patient Condition: The patient has been stabilized such that within reasonable medical probability, no material deterioration of the patient condition or the condition of the unborn child(julio cesar) is likely to result from the transfer    Reason for Transfer: Level of Care needed not available at this facility    Transfer Requirements: [facility] [address]   · Space available and qualified personnel available for treatment as acknowledged by    · Agreed to accept transfer and to provide appropriate medical treatment as acknowledged by       Dr Manuela Castellon  · Appropriate medical records of the examination and treatment of the patient are provided at the time of transfer   [address] _______  · Transfer will be performed by qualified personnel from    and appropriate transfer equipment as required, including the use of necessary and appropriate life support measures  Provider Certification: I have examined the patient and explained the following risks and benefits of being transferred/refusing transfer to the patient/family:  The patient is stable for psychiatric transfer because they are medically stable, and is protected from harming him/herself or others during transport      Based on these reasonable risks and benefits to the patient and/or the unborn child(julio cesar), and based upon the information available at the time of the patients examination, I certify that the medical benefits reasonably to be expected from the provision of appropriate medical treatments at another medical facility outweigh the increasing risks, if any, to the individuals medical condition, and in the case of labor to the unborn child, from effecting the transfer      X____________________________________________ DATE 03/17/22        TIME_______      ORIGINAL - SEND TO MEDICAL RECORDS   COPY - SEND WITH PATIENT DURING TRANSFER

## 2022-03-17 NOTE — ED PROVIDER NOTES
History  Chief Complaint   Patient presents with    Psychiatric Evaluation     pt arrived to ED from home with SI thoughts with no plans  Has thoughts often of hurting herself but has never actually tried   "has very bad depression, no idea that last time I was happy " Transitioning from male to female and stated this has added a lot of stress and not helping the situation     25year old female currently undergoing transition from male presents for evaluation of depression associated with suicidal ideation  She states she has thoughts of taking her own life with her grandfather's gun which is currently locked away in a storage facility by her fiancee  Patient has history of nonsuicidal self harm with cutting using a razor, but those too have been removed from the home and she has not engaged in self harm in several weeks  Patient has been taking hormone therapy for transition for the past 9 months  History provided by:  Patient and significant other  Psychiatric Evaluation  Presenting symptoms: suicidal thoughts    Degree of incapacity (severity): Moderate  Onset quality:  Gradual  Duration:  2 weeks  Timing:  Constant  Progression:  Worsening  Chronicity:  New  Treatment compliance:  Untreated  Relieved by:  Nothing  Worsened by:  Nothing  Ineffective treatments:  None tried  Associated symptoms: fatigue    Associated symptoms: no abdominal pain, no chest pain and no headaches    Risk factors: no hx of suicide attempts and no recent psychiatric admission        Prior to Admission Medications   Prescriptions Last Dose Informant Patient Reported? Taking?    Progesterone 100 MG CAPS 3/16/2022 at Unknown time  Yes Yes   Sig: Take 1 capsule by mouth every evening   cyclobenzaprine (FLEXERIL) 10 mg tablet   No No   Sig: Take 1 tablet (10 mg total) by mouth 3 (three) times a day as needed for muscle spasms   Patient not taking: Reported on 3/6/2022    estradiol (ESTRACE) 2 MG tablet 3/16/2022 at Unknown time  Yes Yes   Si mg Pt taking 4mg BID    meloxicam (MOBIC) 7 5 mg tablet   No No   Sig: Take 1 tablet (7 5 mg total) by mouth daily   Patient not taking: Reported on 3/6/2022    spironolactone (ALDACTONE) 50 mg tablet 3/16/2022 at Unknown time  Yes Yes   Sig: Take 50 mg by mouth 2 (two) times a day      Facility-Administered Medications: None       Past Medical History:   Diagnosis Date    Depression        History reviewed  No pertinent surgical history  History reviewed  No pertinent family history  I have reviewed and agree with the history as documented  E-Cigarette/Vaping    E-Cigarette Use Never User      E-Cigarette/Vaping Substances    Nicotine No     THC No     CBD No     Flavoring No     Other No     Unknown No      Social History     Tobacco Use    Smoking status: Never Smoker    Smokeless tobacco: Never Used   Vaping Use    Vaping Use: Never used   Substance Use Topics    Alcohol use: Not Currently    Drug use: Not Currently       Review of Systems   Constitutional: Positive for fatigue  Negative for chills and fever  HENT: Negative for congestion  Respiratory: Negative for shortness of breath  Cardiovascular: Negative for chest pain, palpitations and leg swelling  Gastrointestinal: Negative for abdominal pain, constipation, diarrhea, nausea and vomiting  Skin: Negative for rash and wound  Neurological: Negative for headaches  Psychiatric/Behavioral: Positive for suicidal ideas  All other systems reviewed and are negative  Physical Exam  Physical Exam  Vitals and nursing note reviewed  Constitutional:       General: She is not in acute distress  Appearance: She is well-developed  She is not toxic-appearing or diaphoretic  HENT:      Head: Normocephalic and atraumatic  Right Ear: External ear normal       Left Ear: External ear normal       Nose: Nose normal    Eyes:      General: No scleral icterus    Pulmonary:      Effort: Pulmonary effort is normal  No respiratory distress  Abdominal:      General: There is no distension  Musculoskeletal:         General: No deformity  Normal range of motion  Skin:     General: Skin is warm and dry  Findings: No rash  Neurological:      General: No focal deficit present  Mental Status: She is alert  Gait: Gait normal    Psychiatric:         Mood and Affect: Mood normal          Thought Content: Thought content includes suicidal ideation  Thought content does not include homicidal ideation  Thought content includes suicidal plan  Vital Signs  ED Triage Vitals [03/16/22 2123]   Temperature Pulse Respirations Blood Pressure SpO2   97 8 °F (36 6 °C) (!) 106 16 139/83 97 %      Temp Source Heart Rate Source Patient Position - Orthostatic VS BP Location FiO2 (%)   Temporal Monitor Sitting Left arm --      Pain Score       No Pain           Vitals:    03/16/22 2123 03/17/22 0820 03/17/22 1432   BP: 139/83 140/81 122/72   Pulse: (!) 106 72 80   Patient Position - Orthostatic VS: Sitting Sitting Sitting         Visual Acuity      ED Medications  Medications - No data to display    Diagnostic Studies  Results Reviewed     Procedure Component Value Units Date/Time    Rapid drug screen, urine [[id number]]  (Normal) Collected: 03/17/22 0018    Lab Status: Final result Specimen: Urine, Clean Catch Updated: 03/17/22 0106     Amph/Meth UR Negative     Barbiturate Ur Negative     Benzodiazepine Urine Negative     Cocaine Urine Negative     Methadone Urine Negative     Opiate Urine Negative     PCP Ur Negative     THC Urine Negative     Oxycodone Urine Negative    Narrative:      FOR MEDICAL PURPOSES ONLY  IF CONFIRMATION NEEDED PLEASE CONTACT THE LAB WITHIN 5 DAYS      Drug Screen Cutoff Levels:  AMPHETAMINE/METHAMPHETAMINES  1000 ng/mL  BARBITURATES     200 ng/mL  BENZODIAZEPINES     200 ng/mL  COCAINE      300 ng/mL  METHADONE      300 ng/mL  OPIATES      300 ng/mL  PHENCYCLIDINE     25 ng/mL  THC       50 ng/mL  OXYCODONE      100 ng/mL    COVID/FLU/RSV - 2 hour TAT [[id number]]  (Normal) Collected: 03/16/22 2159    Lab Status: Final result Specimen: Nares from Nasopharyngeal Swab Updated: 03/16/22 2243     SARS-CoV-2 Negative     INFLUENZA A PCR Negative     INFLUENZA B PCR Negative     RSV PCR Negative    Narrative:      FOR PEDIATRIC PATIENTS - copy/paste COVID Guidelines URL to browser: [email or web address]  [employer]    SARS-CoV-2 assay is a Nucleic Acid Amplification assay intended for the  qualitative detection of nucleic acid from SARS-CoV-2 in nasopharyngeal  swabs  Results are for the presumptive identification of SARS-CoV-2 RNA  Positive results are indicative of infection with SARS-CoV-2, the virus  causing COVID-19, but do not rule out bacterial infection or co-infection  with other viruses  Laboratories within the [place] and its  territories are required to report all positive results to the appropriate  public health authorities  Negative results do not preclude SARS-CoV-2  infection and should not be used as the sole basis for treatment or other  patient management decisions  Negative results must be combined with  clinical observations, patient history, and epidemiological information  This test has not been FDA cleared or approved  This test has been authorized by FDA under an Emergency Use Authorization  (EUA)  This test is only authorized for the duration of time the  declaration that circumstances exist justifying the authorization of the  emergency use of an in vitro diagnostic tests for detection of SARS-CoV-2  virus and/or diagnosis of COVID-19 infection under section 564(b)(1) of  the Act, 21 U  S C  554UYC-7(Y)(6), unless the authorization is terminated  or revoked sooner  The test has been validated but independent review by FDA  and CLIA is pending  Test performed using [employer]:  This RT-PCR assay targets N2,  a region unique to SARS-CoV-2  A conserved region in the E-gene was chosen  for pan-Sarbecovirus detection which includes SARS-CoV-2  POCT alcohol breath test [[id number]]  (Normal) Resulted: 03/16/22 2201    Lab Status: Final result Updated: 03/16/22 2201     EXTBreath Alcohol 0 000                 No orders to display              Procedures  Procedures         ED Course                               SBIRT 20yo+      Most Recent Value   SBIRT (25 yo +)    In order to provide better care to our patients, we are screening all of our patients for alcohol and drug use  Would it be okay to ask you these screening questions? Yes Filed at: 03/16/2022 2323   Initial Alcohol Screen: US AUDIT-C     1  How often do you have a drink containing alcohol? 0 Filed at: 03/16/2022 2323   2  How many drinks containing alcohol do you have on a typical day you are drinking? 0 Filed at: 03/16/2022 2323   3a  Male UNDER 65: How often do you have five or more drinks on one occasion? 0 Filed at: 03/16/2022 2323   3b  FEMALE Any Age, or MALE 65+: How often do you have 4 or more drinks on one occassion? 0 Filed at: 03/16/2022 2323   Audit-C Score 0 Filed at: 03/16/2022 2323   HERBERTH: How many times in the past year have you    Used an illegal drug or used a prescription medication for non-medical reasons? Never Filed at: 03/16/2022 2323                    MDM  Number of Diagnoses or Management Options  Depression: new and requires workup  Suicidal ideation: new and requires workup  Diagnosis management comments: 25year old female presents for evaluation of suicidal ideation  Patient medically cleared for inpatient psychiatric admission  Crisis consulted  201 signed         Amount and/or Complexity of Data Reviewed  Clinical lab tests: ordered and reviewed    Patient Progress  Patient progress: stable      Disposition  Final diagnoses:   Suicidal ideation   Depression     Time reflects when diagnosis was documented in both MDM as applicable and the Disposition within this note     Time User Action Codes Description Comment    3/16/2022 10:51 PM Olamide Chol Add [M90 598] Suicidal ideation     3/16/2022 10:51 PM Olamide Chol Add Ashlie ZHAO] Depression     3/17/2022 12:04 PM Bernadette Jensen Add [Z00 8] Medical clearance for psychiatric admission       ED Disposition     ED Disposition Condition Date/Time Comment    Transfer to [facility] Mar 18, 2022 12:37 PM Ciro Moses should be transferred out to [facility] pending and has been medically cleared          MD Documentation      Most Recent Value   Patient Condition The patient has been stabilized such that within reasonable medical probability, no material deterioration of the patient condition or the condition of the unborn child(julio cesar) is likely to result from the transfer   Reason for Transfer Level of Care needed not available at this facility   Benefits of Transfer Specialized equipment and/or services available at the receiving facility (Include comment)________________________   Risks of Transfer Potential for delay in receiving treatment   Accepting Physician Mauricio Burr Never   Sending MD Dr Vipul Singh   Provider Certification The patient is stable for psychiatric transfer because they are medically stable, and is protected from harming him/herself or others during transport      RN Documentation      Most [address] [place] [address] Mauricio Donnelly      Follow-up Information    None         Discharge Medication List as of 3/17/2022  6:08 PM      CONTINUE these medications which have NOT CHANGED    Details   estradiol (ESTRACE) 2 MG tablet 2 mg Pt taking 4mg BID , Historical Med      Progesterone 100 MG CAPS Take 1 capsule by mouth every evening, Starting Wed 1/26/2022, Historical Med      spironolactone (ALDACTONE) 50 mg tablet Take 50 mg by mouth 2 (two) times a day, Starting Wed 1/26/2022, Historical Med cyclobenzaprine (FLEXERIL) 10 mg tablet Take 1 tablet (10 mg total) by mouth 3 (three) times a day as needed for muscle spasms, Starting Wed 2/2/2022, Normal      meloxicam (MOBIC) 7 5 mg tablet Take 1 tablet (7 5 mg total) by mouth daily, Starting Wed 2/2/2022, Normal             No discharge procedures on file      PDMP Review     None          ED Provider  Electronically Signed by           Gale Gao MD  03/18/22 3404

## 2022-03-17 NOTE — ED NOTES
Patient's fiance stated that patient has previously walked out of house with a loaded gun  Fiance has removed all weapons from home and patient is not able to access them       Francisco Gamboa RN  03/16/22 0166

## 2022-03-17 NOTE — ED NOTES
Two pairs of capris  leggings  bra  4 pairs of underwear  4 pairs of socks  Three shirts  Toiletries: toothpaste,toothbrush,hair brush, loofa, shampoo/body wash/   Stitch plush toy  Alex Lambert  Shirt  Pants  Shoes  One pair dirty sock  Cell phone + [employer] (no money)     All belongings placed in [address] [facility] holding     Genaro Purdy RN  03/16/22 2221      MEDICATIONS IN [address] [place] [address] 83:     Genaro Purdy RN  03/16/22 1043

## 2022-03-17 NOTE — ED NOTES
Patient is accepted at [facility]  Patient is accepted by Dr Reubin Pitcher, Jillyn Sandifer per ADRIANA  Transportation is arranged with [facility]  Transportation is scheduled for **  Patient may go to the floor at **  Nurse report is to be called to [phone] prior to patient transfer

## 2022-03-17 NOTE — ED NOTES
Insurance Authorization for admission:   Phone call placed to [employer] number: [phone]  Spoke to [employer]  7 days approved  Level of care: Inpatient Mental Health  Review on 3/23/22  Authorization # [id number]          Reviewer is:  Gela Pond  [phone]

## 2022-03-17 NOTE — ED NOTES
Pt presented to ED with significant other  Pt reports increase in depression with "everything" being a stressor  Pt reported that they are overwhelmed easily  Pt reported constant and "horrible" anxiety  Pt reported that she used to cut but has not in over a month and a half  Pt reported that she has constant suicidal ideation  Pt reported no plan and no ability to act on plan  Pt reported that she used to see a therapist and is switching to a different organization on 4/13/22 with an appointment at 1700  Pt denies medical or legal issues  Pt reported no substance use or tobacco use  Pt reported no homicidal ideation, delusion, or hallucinations  Pt reported willingness to sign 201 and was explained 72 hour notice

## 2022-03-18 PROBLEM — F41.9 ANXIETY: Status: ACTIVE | Noted: 2022-03-18

## 2022-03-18 PROBLEM — Z00.8 MEDICAL CLEARANCE FOR PSYCHIATRIC ADMISSION: Status: ACTIVE | Noted: 2022-03-18

## 2022-03-18 PROBLEM — F33.9 RECURRENT MAJOR DEPRESSIVE DISORDER (HCC): Status: ACTIVE | Noted: 2022-03-18

## 2022-03-18 PROBLEM — F60.9 PERSONALITY DISORDER (HCC): Status: ACTIVE | Noted: 2022-03-18

## 2022-03-18 PROBLEM — Z78.9 TRANSGENDER: Status: ACTIVE | Noted: 2022-03-18

## 2022-03-18 PROBLEM — R45.851 SUICIDAL IDEATION: Status: ACTIVE | Noted: 2022-03-18

## 2022-03-18 LAB
ALBUMIN SERPL BCP-MCNC: 3.5 G/DL (ref 3.5–5)
ALP SERPL-CCNC: 60 U/L (ref 46–116)
ALT SERPL W P-5'-P-CCNC: 32 U/L (ref 12–78)
ANION GAP SERPL CALCULATED.3IONS-SCNC: 8 MMOL/L (ref 4–13)
AST SERPL W P-5'-P-CCNC: 14 U/L (ref 5–45)
ATRIAL RATE: 71 BPM
BASOPHILS # BLD AUTO: 0.03 THOUSANDS/ΜL (ref 0–0.1)
BASOPHILS NFR BLD AUTO: 0 % (ref 0–1)
BILIRUB SERPL-MCNC: 0.3 MG/DL (ref 0.2–1)
BUN SERPL-MCNC: 15 MG/DL (ref 5–25)
CALCIUM SERPL-MCNC: 8.6 MG/DL (ref 8.3–10.1)
CHLORIDE SERPL-SCNC: 105 MMOL/L (ref 100–108)
CHOLEST SERPL-MCNC: 163 MG/DL
CO2 SERPL-SCNC: 24 MMOL/L (ref 21–32)
CREAT SERPL-MCNC: 0.81 MG/DL (ref 0.6–1.3)
EOSINOPHIL # BLD AUTO: 0.14 THOUSAND/ΜL (ref 0–0.61)
EOSINOPHIL NFR BLD AUTO: 1 % (ref 0–6)
ERYTHROCYTE [DISTWIDTH] IN BLOOD BY AUTOMATED COUNT: 15 % (ref 11.6–15.1)
EST. AVERAGE GLUCOSE BLD GHB EST-MCNC: 105 MG/DL
GLUCOSE P FAST SERPL-MCNC: 90 MG/DL (ref 65–99)
GLUCOSE SERPL-MCNC: 90 MG/DL (ref 65–140)
HBA1C MFR BLD: 5.3 %
HCT VFR BLD AUTO: 34.8 % (ref 36.5–46.1)
HDLC SERPL-MCNC: 48 MG/DL
HGB BLD-MCNC: 10.7 G/DL (ref 12–15.4)
IMM GRANULOCYTES # BLD AUTO: 0.03 THOUSAND/UL (ref 0–0.2)
IMM GRANULOCYTES NFR BLD AUTO: 0 % (ref 0–2)
LDLC SERPL CALC-MCNC: 92 MG/DL (ref 0–100)
LYMPHOCYTES # BLD AUTO: 3.98 THOUSANDS/ΜL (ref 0.6–4.47)
LYMPHOCYTES NFR BLD AUTO: 35 % (ref 14–44)
MCH RBC QN AUTO: 19.3 PG (ref 26.8–34.3)
MCHC RBC AUTO-ENTMCNC: 30.7 G/DL (ref 31.4–37.4)
MCV RBC AUTO: 63 FL (ref 82–98)
MONOCYTES # BLD AUTO: 0.88 THOUSAND/ΜL (ref 0.17–1.22)
MONOCYTES NFR BLD AUTO: 8 % (ref 4–12)
NEUTROPHILS # BLD AUTO: 6.22 THOUSANDS/ΜL (ref 1.85–7.62)
NEUTS SEG NFR BLD AUTO: 56 % (ref 43–75)
NONHDLC SERPL-MCNC: 115 MG/DL
NRBC BLD AUTO-RTO: 0 /100 WBCS
P AXIS: 60 DEGREES
PLATELET # BLD AUTO: 302 THOUSANDS/UL (ref 149–390)
PMV BLD AUTO: 11.7 FL (ref 8.9–12.7)
POTASSIUM SERPL-SCNC: 3.8 MMOL/L (ref 3.5–5.3)
PR INTERVAL: 130 MS
PROT SERPL-MCNC: 7.8 G/DL (ref 6.4–8.2)
QRS AXIS: 55 DEGREES
QRSD INTERVAL: 92 MS
QT INTERVAL: 412 MS
QTC INTERVAL: 447 MS
RBC # BLD AUTO: 5.53 MILLION/UL (ref 3.88–5.12)
RPR SER QL: NORMAL
SODIUM SERPL-SCNC: 137 MMOL/L (ref 136–145)
T WAVE AXIS: 34 DEGREES
TRIGL SERPL-MCNC: 113 MG/DL
TSH SERPL DL<=0.05 MIU/L-ACNC: 3.69 UIU/ML (ref 0.36–3.74)
VENTRICULAR RATE: 71 BPM
WBC # BLD AUTO: 11.28 THOUSAND/UL (ref 4.31–10.16)

## 2022-03-18 PROCEDURE — 83036 HEMOGLOBIN GLYCOSYLATED A1C: CPT | Performed by: PHYSICIAN ASSISTANT

## 2022-03-18 PROCEDURE — 99223 1ST HOSP IP/OBS HIGH 75: CPT | Performed by: STUDENT IN AN ORGANIZED HEALTH CARE EDUCATION/TRAINING PROGRAM

## 2022-03-18 PROCEDURE — 80053 COMPREHEN METABOLIC PANEL: CPT | Performed by: PHYSICIAN ASSISTANT

## 2022-03-18 PROCEDURE — 99253 IP/OBS CNSLTJ NEW/EST LOW 45: CPT | Performed by: HOSPITALIST

## 2022-03-18 PROCEDURE — 80061 LIPID PANEL: CPT | Performed by: PHYSICIAN ASSISTANT

## 2022-03-18 PROCEDURE — 93010 ELECTROCARDIOGRAM REPORT: CPT | Performed by: INTERNAL MEDICINE

## 2022-03-18 PROCEDURE — 84443 ASSAY THYROID STIM HORMONE: CPT | Performed by: PHYSICIAN ASSISTANT

## 2022-03-18 PROCEDURE — 86592 SYPHILIS TEST NON-TREP QUAL: CPT | Performed by: PHYSICIAN ASSISTANT

## 2022-03-18 PROCEDURE — 93005 ELECTROCARDIOGRAM TRACING: CPT

## 2022-03-18 PROCEDURE — 85025 COMPLETE CBC W/AUTO DIFF WBC: CPT | Performed by: PHYSICIAN ASSISTANT

## 2022-03-18 RX ORDER — QUETIAPINE FUMARATE 25 MG/1
50 TABLET, FILM COATED ORAL
Status: DISCONTINUED | OUTPATIENT
Start: 2022-03-18 | End: 2022-03-20

## 2022-03-18 RX ORDER — ESCITALOPRAM OXALATE 10 MG/1
10 TABLET ORAL DAILY
Status: DISCONTINUED | OUTPATIENT
Start: 2022-03-18 | End: 2022-03-22 | Stop reason: HOSPADM

## 2022-03-18 RX ADMIN — ESCITALOPRAM OXALATE 10 MG: 10 TABLET ORAL at 12:13

## 2022-03-18 RX ADMIN — SPIRONOLACTONE 50 MG: 25 TABLET ORAL at 12:12

## 2022-03-18 RX ADMIN — TRAZODONE HYDROCHLORIDE 50 MG: 50 TABLET ORAL at 23:38

## 2022-03-18 RX ADMIN — SPIRONOLACTONE 50 MG: 25 TABLET ORAL at 22:07

## 2022-03-18 RX ADMIN — ACETAMINOPHEN 325MG 975 MG: 325 TABLET ORAL at 23:38

## 2022-03-18 RX ADMIN — PROGESTERONE 100 MG: 100 CAPSULE ORAL at 17:47

## 2022-03-18 RX ADMIN — QUETIAPINE FUMARATE 50 MG: 25 TABLET ORAL at 22:07

## 2022-03-18 NOTE — NURSING NOTE
Pt complained Insomnia and requested Trazodone to help him sleep   PRN Trazodone 50 mg given at 2200 which was effective as Pt was later observed sleeping her room

## 2022-03-18 NOTE — CASE MANAGEMENT
Readmit score:  21 (Yellow)   Confirmed Address:    [place]: [address], [address], [place], [address]   Resides in the home with: Pt lives with her fiance & her parents  Will Return Home at Discharge: Yes   Confirmed Phone Number: (home) [phone]  (cell)[phone]   Confirmed Email Address: [email or web address]    Marital Status:         Children: Engage    None   Family History:                          Parents:                           Siblings: Pt reported that her mom has some depression, but not as severe as Pt's  Pt said that her mom is supportive, but sometimes gives missed signals about her support  Pt said that her dad is just augustin there  Pt has no siblings  Commitment Status: 201   Admitted from:   [facility] on 3/16/2022 at 2121   Presenting C/O:         Pt reported that she talked to her fiance about it, & wanted to come, but needed her to say that she had to & not let her back out  Pt said that she has felt depressed for as long as she can remember, but she it was just becoming too overwhelming  Past Inpatient Tx: Pt denied any previous inpatient admissions  Past Suicide Attempts:   Pt denied  On 3/6/22 Pt did pick the lock on the gun safe to get a gun out, & she reported SI   Pt's mom, called Pt's therapist, who in turn called the police  Pt was taken to the hospital & evaluated & discharged home  Current outpatient:    Psychiatrist:   None  Pt has an intake on 4/13, she thinks at [facility], but is not sure  Therapist:   None  Pt was doing phone sessions with a family friend, but said it wasn't going anywhere, so she stopped  ACT/ICM/CPS/WRT/SC: None     PCP:   Pt said that she just switched to a new provider, & doesn't remember the name  Hx:   Pt denied any medical concerns at this time      Medications:   Pt was not taking any antidepressant prior to this admission or in the past     Pharmacy:   [facility] [place]   Spirituality/Gnosticism: Pt denied any Pentecostal/spiritual request     Education:   Pt reported that she graduated high school, but could remember when; she is 24 y/o old  Pt said that she had gone to regular school, & then her mom pulled her out & put her into cyber school, but she wasn't doing the work, so then they switched to traditional home school  Pt said that 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  Work/Income:    Preferred time for appts: Pt works at [employer] has worked there for 2 years  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  She works Monday through Friday 7AM to 5 PM     Legal:     Probation/[place] Ofc: Pt denied      N/A   Access to Firearms:   Pt said that her fiance has removed them all from the home, as Pt can pick locks  Pt laughing stating that her lock picking tools were actually in the safe with the gun, & she had to make tools to use to pick the lock  Pt said that her fiance was impressed with her ability to do it, but also mad that she was able to get the gun  Transportation:   Pt drives independently    Strength:   Pt identified that she is a caring person, as a strength  Coping Skills:   Pt reported that she use to cut, but hasn't in 1 5 months  Pt identified that she plays video games, & is always on her phone as coping skills  Goal:   Pt identified that her goal is to, "get a diagnosis, get pills, & get out of here"  Referrals Needed:   Pt said that she thinks she already has an intake at [facility] on 4/13; CM will confirm  Pt not interested in ICM services  Transport at Discharge: Pt said that her mom or her fiance will provide transportation  IMM: N/A Brigitte Text PAZ: N/A   Emergency Contact:  Kaitlyn Remy([facility]) [phone]   ROIs obtained:   Darren Remy([facility])  [facility] (OP)   Insurance:    [employer] PPO   Audit:  0      PAWSS: 0 BAT: 0 UDS: negative   Substance Abuse: Freq   Amount Last Use Notes:   Heroin       Amp/Meth Alcohol None      Cocaine       Cannabis       Benzodiazepine       Barbituartes       Other       Tobacco None

## 2022-03-18 NOTE — NURSING NOTE
Pt score High risk on C-SSRS scale  Yvonne Meyer was contacted  No new orders placed  Pt verbally states being able to reach out to staff if struggling with SI, anxiety or depression  Q7 minutes checks continue and hourly rounding completed by staff   Pt currently on phone with SO

## 2022-03-18 NOTE — PLAN OF CARE
Patient participated in 0/3 groups for the day    Problem: Ineffective Coping  Goal: Participates in unit activities  Description: Interventions:  - Provide therapeutic environment   - Provide required programming   - Redirect inappropriate behaviors   Outcome: Not Progressing

## 2022-03-18 NOTE — CONSULTS
[address]  [facility], 25 y o  adult MRN: [id number]  Unit/Bed#: [facility] 508-39 Encounter: [id number]  Primary Care Provider: Kaity Aviles DO   Date and time admitted to hospital: 3/17/2022  6:31 PM    Inpatient consult for Medical Clearance for [facility] patient  Consult performed by: Ange Hernandes PA-C  Consult ordered by: Heidi Ambrosio PA-C          Medical clearance for psychiatric admission  Assessment & Plan  Vitals reviewed  Labs pending   EKG reviewed -  Normal sinus rhythm with sinus arrhythmia,  HR 71,   Patient is medically cleared for admission to the [facility] for treatment of the underlying psychiatric illness  Will continue to follow patient throughout admission     Transgender  Assessment & Plan   Currently transitioning from male to female  Estrace mg b i d  Progesterone 100 mg  Every evening  Spironolactone 50 mg b i d  Continue all    Suicidal ideation  Assessment & Plan  Management per psychiatry       Counseling / Coordination of Care Time: 30 minutes  Greater than 50% of total time spent on patient counseling and coordination of care  Collaboration of Care: Were Recommendations Directly Discussed with Primary Treatment Team? - Yes     History of Present Illness:    Swati Lamar is a 25 y o  adult who is originally admitted to the psychiatry service due to  Suicidal ideation  We are consulted for medical clearance for admission to [facility] and treatment of underlying psychiatric illness  same has no past medical history other than she is currently transitioning from male  Assigned at birth to female  Patient seen and examined  Patient denies any physical complaints at this time such as dizziness, headaches, chest pain, shortness of breath, nausea/vomiting/diarrhea, urinary symptoms at this time  Inpatient admission for psychiatric evaluation    Will continue to follow with patient throughout hospitalization      Review of Systems:    Review of Systems   Constitutional: Negative for activity change, chills and fever  HENT: Negative for congestion, rhinorrhea and sore throat  Eyes: Negative for visual disturbance  Respiratory: Negative for cough, chest tightness and shortness of breath  Cardiovascular: Negative for chest pain and palpitations  Gastrointestinal: Negative for abdominal pain, constipation, diarrhea, nausea and vomiting  Genitourinary: Negative for difficulty urinating, dysuria, frequency and urgency  Musculoskeletal: Negative for arthralgias and myalgias  Skin: Negative for rash  Neurological: Negative for seizures, syncope, weakness and headaches  Psychiatric/Behavioral: Positive for dysphoric mood and suicidal ideas  All other systems reviewed and are negative  Past Medical and Surgical History:     Past Medical History:   Diagnosis Date    Depression        No past surgical history on file  Meds/Allergies:    PTA meds:   Prior to Admission Medications   Prescriptions Last Dose Informant Patient Reported? Taking?    Progesterone 100 MG CAPS   Yes No   Sig: Take 1 capsule by mouth every evening   cyclobenzaprine (FLEXERIL) 10 mg tablet   No No   Sig: Take 1 tablet (10 mg total) by mouth 3 (three) times a day as needed for muscle spasms   Patient not taking: Reported on 3/6/2022    estradiol (ESTRACE) 2 MG tablet   Yes No   Si mg Pt taking 4mg BID    meloxicam (MOBIC) 7 5 mg tablet   No No   Sig: Take 1 tablet (7 5 mg total) by mouth daily   Patient not taking: Reported on 3/6/2022    spironolactone (ALDACTONE) 50 mg tablet   Yes No   Sig: Take 50 mg by mouth 2 (two) times a day      Facility-Administered Medications: None       Allergies: No Known Allergies    Social History:     Marital Status: Single    Substance Use History:   Social History     Substance and Sexual Activity   Alcohol Use Not Currently     Social History     Tobacco Use   Smoking Status Never Smoker   Smokeless Tobacco Never Used     Social History     Substance and Sexual Activity   Drug Use Not Currently       Family History:    History reviewed  No pertinent family history  Physical Exam:     Vitals:   Blood Pressure: 119/68 (03/18/22 0752)  Pulse: 81 (03/18/22 0752)  Temperature: 98 1 °F (36 7 °C) (03/18/22 0752)  Temp Source: Tympanic (03/18/22 0752)  Respirations: 16 (03/18/22 0752)  Height: 5' 11" (180 3 cm) (03/17/22 1853)  Weight - Scale: 115 kg (253 lb 6 4 oz) (03/17/22 1853)  SpO2: 96 % (03/18/22 0752)    Physical Exam  Vitals and nursing note reviewed  Constitutional:       Appearance: Normal appearance  HENT:      Head: Normocephalic and atraumatic  Nose: No congestion  Mouth/Throat:      Mouth: Mucous membranes are moist    Eyes:      Conjunctiva/sclera: Conjunctivae normal    Cardiovascular:      Rate and Rhythm: Normal rate and regular rhythm  Pulses: Normal pulses  Heart sounds: Normal heart sounds  No murmur heard  Pulmonary:      Effort: Pulmonary effort is normal  No respiratory distress  Breath sounds: Normal breath sounds  Abdominal:      General: Bowel sounds are normal       Palpations: Abdomen is soft  Tenderness: There is no abdominal tenderness  Musculoskeletal:         General: Normal range of motion  Skin:     General: Skin is warm and dry  Neurological:      Mental Status: She is alert and oriented to person, place, and time  Psychiatric:         Mood and Affect: Mood is depressed  Affect is flat  Behavior: Behavior is cooperative  Additional Data:     Lab Results: I have personally reviewed pertinent reports  No results found for: HGBA1C        EKG, Pathology, and Other Studies Reviewed on Admission:   · EKG  Normal sinus rhythm with sinus arrhythmia,     ** Please Note: This note has been constructed using a voice recognition system   **

## 2022-03-18 NOTE — NURSING NOTE
Patient prefers to be called "Christopher" and prefers the use of she and they pronouns  Patient is visualized in her room, denies current SI/HI/AH/VH  Patient is focused on discharge, states, "I have a really supportive family and support system at home with my fiance, and that's where I feel the most comfortable  Being here isn't super helpful to me  I really just came in for a diagnosis and medication  My mom is a nurse, so I feel safe to go home " Patient educated on unit schedule and importance of trying to attempt group sessions  Patient given 1:1 support with active listening  Patient encouraged to seek staff with any issues and/or concerns

## 2022-03-18 NOTE — ASSESSMENT & PLAN NOTE
Currently transitioning from male to female  Estrace mg b i d  Progesterone 100 mg  Every evening  Spironolactone 50 mg b i d    Continue all

## 2022-03-18 NOTE — NURSING NOTE
Deedee Mealy potter books  Un opened   Two jeans   Blue pants   Gray sweat shirt   Bag of candy   Sour patch kids   peanut m&Ms   starburst jelly beans   Jolly ranch jelly beans ( this  opened them to give foster some)  Oreos big pack  Ariane chocolate bars big size     Bedside     My hero books (2)   Army pants and green shirt   Black pants and white and black shirt   Stitch sweater   Red shirt   And black pants and   3 pairs of socks
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
## 2022-03-18 NOTE — NURSING NOTE
Pt is cooperative and appropriate during interaction  Reports some difficulty falling asleep but is generally able to stay asleep throughout the night  Expresses elevated anxiety r/t being on unit and was unable to eat breakfast   Apprehensive r/t groups and plans to stay in room  RN encouraged pt to attend as comfortable  Denies SI or thoughts of self harm currently  Denies HI/AVH  Pt interested in starting medication for depression and anxiety

## 2022-03-18 NOTE — PROGRESS NOTES
Treatment Plan Meeting:  Diagnosis of Recurrent major depressive disorder, suicidal ideation, transgender, & anxiety reviewed  Discussed short term goals for decrease in depressive symptoms, decrease in anxiety symptoms, decrease in suicidal thoughts, decrease in self abusive behaviors, improvement in insight, improvement in self care, sleep improvement, improvement in appetite  Pt would like outpatient services  She said she has an intake on 4/17 but is unsure where  At this time, d/c is tentative for Tues  All parties in agreement & treatment plan was signed       03/18/22 1410   Team Meeting   Meeting Type Tx Team Meeting   Initial Conference Date 03/18/22   Next Conference Date 04/14/22   Team Members Present   Team Members Present Physician;Nurse;   Physician Team Member Dr Breanna Matamoros Team Member [facility] Management Team Member Junie Ayala   Patient/Family Present   Patient Present No  (Pt reviewed & signed Treatment Plan earlier with CM; Pt declined to meet with Treatment Team )   Patient's Family Present No

## 2022-03-18 NOTE — PLAN OF CARE
Problem: SELF HARM/SUICIDALITY  Goal: Will have no self-injury during hospital stay  Description: INTERVENTIONS:  - Q 15 MINUTES: Routine safety checks  - Q WAKING SHIFT & PRN: Assess risk to determine if routine checks are adequate to maintain patient safety  - Encourage patient to participate actively in care by formulating a plan to combat response to suicidal ideation, identify supports and resources  Outcome: Progressing     Problem: DEPRESSION  Goal: Will be euthymic at discharge  Description: INTERVENTIONS:  - Administer medication as ordered  - Provide emotional support via 1:1 interaction with staff  - Encourage involvement in milieu/groups/activities  - Monitor for social isolation  Outcome: Progressing     Problem: ANXIETY  Goal: Will report anxiety at manageable levels  Description: INTERVENTIONS:  - Administer medication as ordered  - Teach and encourage coping skills  - Provide emotional support  - Assess patient/family for anxiety and ability to cope  Outcome: Progressing  Goal: By discharge: Patient will verbalize 2 strategies to deal with anxiety  Description: Interventions:  - Identify any obvious source/trigger to anxiety  - Staff will assist patient in applying identified coping technique/skills  - Encourage attendance of scheduled groups and activities  Outcome: Progressing     Problem: SELF CARE DEFICIT  Goal: Return ADL status to a safe level of function  Description: INTERVENTIONS:  - Administer medication as ordered  - Assess ADL deficits and provide assistive devices as needed  - Obtain PT/OT consults as needed  - Assist and instruct patient to increase activity and self care as tolerated  Outcome: Progressing     Problem: Ineffective Coping  Goal: Participates in unit activities  Description: Interventions:  - Provide therapeutic environment   - Provide required programming   - Redirect inappropriate behaviors   Outcome: Progressing

## 2022-03-18 NOTE — ASSESSMENT & PLAN NOTE
Vitals reviewed  Labs pending   EKG reviewed -  Normal sinus rhythm with sinus arrhythmia,  HR 71,   Patient is medically cleared for admission to the [facility] for treatment of the underlying psychiatric illness  Will continue to follow patient throughout admission

## 2022-03-18 NOTE — CMS CERTIFICATION NOTE
Recertification: Based upon physical, mental and social evaluations, I certify that inpatient psychiatric services continue to be medically necessary for this patient for a duration of 7 midnights for the treatment of  Recurrent major depressive disorder ([facility] [address] )   Available alternative community resources still do not meet the patient's mental health care needs  I further attest that an established written individualized plan of care has been updated and is outlined in the patient's medical records

## 2022-03-18 NOTE — NURSING NOTE
Locker   Book bag black   Stuff animal   Wallet pink   Chime card [employer] [id number]  Drivers License [place]  Drake sawyer (2)  Deoderant secret   Tooth paste   Tooth brush   Bath scrub   And clear case   Stuff animal stitch  No cell phone upon arrival     Bedside   Four under wear light blue, pink   black and gray strips, off gray   Tie dye blue and pink bra   Harles Gains pants   Note book with dragon on it   And slippers    pony tail holders     Travel size shampoo and condition  Trupti   Travel size Deoderant secret  And body wash travel size
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
## 2022-03-18 NOTE — TREATMENT PLAN
TREATMENT PLAN REVIEW - 90 [facility] 22 y o  2000 adult MRN: [id number]    [address] [place] [address] Room / Bed: [place] [address]/[facility] [address] Encounter: [id number]          Admit Date/Time:  3/17/2022  6:31 PM    Treatment Team: Attending Provider: Maria L Cuenca MD; Patient Care Assistant: Mao Cole; Charge Nurse: Patel eGrard, RN; Security: PhysicianPortala; Patient Care Technician: Destinee Bob; Registered Nurse: Jose Enrique Roldan, RN; Charge Nurse: Lea Horne, ALMAZ; Registered Nurse: Ted Burton RN; Patient Care Assistant: Rui Gannon;  : Tam Alonso    Diagnosis: Principal Problem:    Recurrent major depressive disorder ([facility] )  Active Problems:    Medical clearance for psychiatric admission    Suicidal ideation    Transgender    Anxiety      Patient Strengths/Assets: ability for insight, motivation for treatment/growth, patient is on a voluntary commitment    Patient Barriers/Limitations: marital/family conflict    Short Term Goals: decrease in depressive symptoms, decrease in anxiety symptoms, decrease in suicidal thoughts, decrease in self abusive behaviors, improvement in insight, improvement in self care, sleep improvement, improvement in appetite    Long Term Goals: improvement in depression, improvement in anxiety, resolution of depressive symptoms, stabilization of mood, free of suicidal thoughts, no self abusive behavior, adequate self care    Progress Towards Goals: starting psychiatric medications as prescribed    Recommended Treatment: medication management, patient medication education, group therapy, milieu therapy, continued Behavioral Health psychiatric evaluation/assessment process    Treatment Frequency: daily medication monitoring, group and milieu therapy daily, monitoring through interdisciplinary rounds, monitoring through weekly patient care conferences    Expected Discharge Date:  5-7 days    Discharge Plan: referral for outpatient medication management with a psychiatrist, referral for outpatient psychotherapy    Treatment Plan Created/Updated By: Kar Colón MD

## 2022-03-18 NOTE — CASE MANAGEMENT
BALWINDER contacted Onur Coronel [facility] scheduling @ [phone] & left a message seeking a return call to confirm Pt's appt on 4/13 & also ask if there was a sooner appointment available  BALWINDER contacted Pt's Kaitlyn chacon, @ [phone] & she reported that Pt's mood is up & down  She reported that it has been that way for a long time  She also reported that she knows that Pt is struggling with the whole transitioning & not feeling like herself in her own skin  CM inquired how long they had been together & she said 5 months, but they just hit it off so well that it progressed quickly to them becoming engaged  CM reviewed medications Pt will be starting & that no discharge date is set yet, however, Pt has verbalized that she would like discharge as soon as possible  Kaitlyn asked if Tuesday would be the soonest & BALWINDER said most likely  CM provided her direct number, along with the patient phone numbers, & the nurses station number  CM agreed to provide an update on Monday

## 2022-03-19 PROCEDURE — 99232 SBSQ HOSP IP/OBS MODERATE 35: CPT | Performed by: PSYCHIATRY & NEUROLOGY

## 2022-03-19 RX ADMIN — QUETIAPINE FUMARATE 50 MG: 25 TABLET ORAL at 21:50

## 2022-03-19 RX ADMIN — TRAZODONE HYDROCHLORIDE 50 MG: 50 TABLET ORAL at 21:51

## 2022-03-19 RX ADMIN — PROGESTERONE 100 MG: 100 CAPSULE ORAL at 17:29

## 2022-03-19 RX ADMIN — SPIRONOLACTONE 50 MG: 25 TABLET ORAL at 21:50

## 2022-03-19 RX ADMIN — ESCITALOPRAM OXALATE 10 MG: 10 TABLET ORAL at 09:29

## 2022-03-19 RX ADMIN — SPIRONOLACTONE 50 MG: 25 TABLET ORAL at 09:29

## 2022-03-19 NOTE — PLAN OF CARE
Problem: SELF HARM/SUICIDALITY  Goal: Will have no self-injury during hospital stay  Description: INTERVENTIONS:  - Q 15 MINUTES: Routine safety checks  - Q WAKING SHIFT & PRN: Assess risk to determine if routine checks are adequate to maintain patient safety  - Encourage patient to participate actively in care by formulating a plan to combat response to suicidal ideation, identify supports and resources  Outcome: Progressing     Problem: ANXIETY  Goal: Will report anxiety at manageable levels  Description: INTERVENTIONS:  - Administer medication as ordered  - Teach and encourage coping skills  - Provide emotional support  - Assess patient/family for anxiety and ability to cope  Outcome: Progressing

## 2022-03-19 NOTE — NURSING NOTE
Exhibited some difficulty getting to sleep, chiefly due to lower back pain, rated at a 7  Tylenol 975 mg  Po prn, given along with Trazodone 50 mg  PO PRN at 2338/sleep  Good effect obtained, as observed asleep in bed within the hr & remains asleep at present  Will continue to monitor

## 2022-03-19 NOTE — NURSING NOTE
Pt remains seclusive to room, stated she  dosent like crowds of people, denies SI HI AVH  Appears depressed, scant conversation

## 2022-03-19 NOTE — PROGRESS NOTES
Progress Note - Behavioral Health   Marshall Douse 25 y o  adult MRN: [id number]  Unit/Bed#: [facility] 253-02 Encounter: [id number]    Assessment/Plan   Principal Problem:    Recurrent major depressive disorder ([address] )  Active Problems:    Medical clearance for psychiatric admission    Suicidal ideation    Transgender    Anxiety    Personality disorder [facility])  Patient has been seclusive to room and appears depressed and withdrawn  Overall calm and cooperative with interview however  Continues to be fixated on discharge and denies in-patient unit to be therapeutic  Denies any safety concerning symptoms or hallucinogenic material   Reports that mood is currently stable but does have moderate level of anxiety  Sleep was somewhat improved last night but continues to have trouble with initiation and not feeling rested  Does make bizarre statements about having dissociative identity disorder for which she is convinced she has done significant research to support this claim  Symptoms are suggestive of cluster B traits but overall mood does seem to be more stable than detailed on admission  Patient reports dissociative identity disorder but does not display any symptoms consistent with this including lack of endorsing periods of time/intervals of time that arm missing  Will continue medications without change at this time  Will continue to monitor  Recommended Treatment:   1) Continue Lexapro 10 mg PO QD for mood and anxiety  2) Continue Seroquel 50 mg PO QHS for mood stabilization and sleep support    Continue with group therapy, milieu therapy and occupational therapy  Continue frequent safety checks and vitals per unit protocol    Case discussed with treatment team   Risks, benefits and possible side effects of Medications: Risks, benefits, and possible side effects of medications have been explained to the patient, who verbalizes understanding    ------------------------------------------------------------    Subjective: Per nursing report, Alise An has been cooperative on the unit and compliant with medications  Today, Alise An is consenting for safety on the unit  She reports that her mood is better and she is all right I guess    Denies any side effects to medications or physical symptoms  Denies SI/HI/AVH  Denies feeling depressed right now but states that my anxiety is always high    Denies having breakfast but states that this is typical for her and overall states appetite is within normal limits  Does report that she is fidgeting on occasion  Indicates that sleep continues to have some trouble with initiation but overall is improved though she continues to not feel rested  Does endorse that she has dissociative identity disorder for which he has done individual research  Reports that she is able to communicate to her alters and that the change in number and she noticed this several years ago  Per nursing staff, patient has not been engaged in groups and is seclusive to her room  Patient was given Tylenol for lower back pain and trazodone that did appear to help with sleep  Progress Toward Goals: slow improvement    Psychiatric Review of Systems:  Behavior over the last 24 hours: mild improvement  Sleep: difficulty falling asleep  Appetite: adequate  Medication side effects: none verbalized  ROS: Complete review of systems is negative except as noted above      Vital signs in last 24 hours:  Temp:  [97 9 °F (36 6 °C)-98 3 °F (36 8 °C)] 98 3 °F (36 8 °C)  HR:  [81-90] 87  Resp:  [14-17] 14  BP: (117-132)/(59-78) 117/59    Mental Status Exam:  Appearance:  drowsy, intermittant eye contact, appears older than stated age, casually dressed, marginal grooming/hygiene, overweight and transgender female   Behavior:  calm, limited cooperativity and laying in bed   Motor: no abnormal movements, psychomotor retardation and Unable to assess   Speech:  spontaneous, slow, soft and coherent   Mood:  "Alright, I guess"   Affect:  mood-incongruent, blunted and depressed   Thought Process:  Organized, logical, goal-directed   Thought Content: no verbalized delusions or overt paranoia   Perceptual disturbances: no reported hallucinations and does not appear to be responding to internal stimuli at this time   Risk Potential: No active or passive suicidal or homicidal ideation was verbalized during interview, Low potential for aggression based on previous behavior   Cognition: oriented to self and situation, memory grossly intact, appears to be of average intelligence, normal abstract reasoning and cognition not formally tested   Insight:  Improving   Judgment: Improving     Current Medications:  Current Facility-Administered Medications   Medication Dose Route Frequency Provider Last Rate    acetaminophen  650 mg Oral Q6H PRN Leotha Cheney[place], PA-C      acetaminophen  650 mg Oral Q4H PRN Leotha [place]ney[place], PA-C      acetaminophen  975 mg Oral Q6H PRN Leotha [place]ney[place], PA-C      aluminum-magnesium hydroxide-simethicone  30 mL Oral Q4H PRN Leotha [place]ney[place], PA-C      artificial tear  1 application Both Eyes X4U PRN Leotha [place]ney[place], PA-C      benztropine  1 mg Intramuscular Q4H PRN Max 6/day Leotha [place]ney[place], PA-C      benztropine  1 mg Oral Q4H PRN Max 6/day Leotha [place]ney[place], PA-C      bisacodyl  10 mg Rectal Daily PRN Leotha [place]ney[place], PA-C      hydrOXYzine HCL  50 mg Oral Q6H PRN Max 4/day Leotha Cheney[place], PA-C      Or    diphenhydrAMINE  50 mg Intramuscular Q6H PRN Leotha [place]ney[place], PA-C      escitalopram  10 mg Oral Daily Jeancarlos Stern MD      hydrOXYzine HCL  100 mg Oral Q6H PRN Max 4/day Leotha Cheney[place], PA-C      Or    LORazepam  2 mg Intramuscular Q6H PRN Leotha [place]ney[place], PA-C      hydrOXYzine HCL  25 mg Oral Q6H PRN Max 4/day Leotha [place]ney[place], PA-C      OLANZapine  10 mg Oral Q3H PRN Max 3/day Leotha [place]ney[place], PA-C      Or  OLANZapine  10 mg Intramuscular Q3H PRN Max 3/day Marin Tate PA-C      OLANZapine  5 mg Oral Q3H PRN Max 6/day Marin Tate PA-C      Or    OLANZapine  5 mg Intramuscular Q3H PRN Max 6/day Marin Tate PA-C      OLANZapine  2 5 mg Oral Q3H PRN Max 8/day Marin Tate PA-C      patient supplied medication  2 each Sublingual BID Hebert Nova MD      polyethylene glycol  17 g Oral Daily PRN Marin Tate PA-C      Progesterone  100 mg Oral QPM Marin Tate PA-C      QUEtiapine  50 mg Oral HS Hebert Nova MD      senna-docusate sodium  1 tablet Oral Daily PRN Marin Tate PA-C      spironolactone  50 mg Oral BID Marin Tate PA-C      traZODone  50 mg Oral HS PRN Marin Tate PA-C         Behavioral Health Medications: all current active meds have been reviewed  Changes as in plan section above  Laboratory results:  I have personally reviewed all pertinent laboratory/tests results    Recent Results (from the past 48 hour(s))   CBC and differential    Collection Time: 03/18/22  6:25 AM   Result Value Ref Range    WBC 11 28 (H) 4 31 - 10 16 Thousand/uL    RBC 5 53 (H) 3 88 - 5 12 Million/uL    Hemoglobin 10 7 (L) 12 0 - 15 4 g/dL    Hematocrit 34 8 (L) 36 5 - 46 1 %    MCV 63 (L) 82 - 98 fL    MCH 19 3 (L) 26 8 - 34 3 pg    MCHC 30 7 (L) 31 4 - 37 4 g/dL    RDW 15 0 11 6 - 15 1 %    MPV 11 7 8 9 - 12 7 fL    Platelets 331 127 - 188 Thousands/uL    nRBC 0 /100 WBCs    Neutrophils Relative 56 43 - 75 %    Immat GRANS % 0 0 - 2 %    Lymphocytes Relative 35 14 - 44 %    Monocytes Relative 8 4 - 12 %    Eosinophils Relative 1 0 - 6 %    Basophils Relative 0 0 - 1 %    Neutrophils Absolute 6 22 1 85 - 7 62 Thousands/µL    Immature Grans Absolute 0 03 0 00 - 0 20 Thousand/uL    Lymphocytes Absolute 3 98 0 60 - 4 47 Thousands/µL    Monocytes Absolute 0 88 0 17 - 1 22 Thousand/µL    Eosinophils Absolute 0 14 0 00 - 0 61 Thousand/µL Basophils Absolute 0 03 0 00 - 0 10 Thousands/µL   Comprehensive metabolic panel    Collection Time: 03/18/22  6:25 AM   Result Value Ref Range    Sodium 137 136 - 145 mmol/L    Potassium 3 8 3 5 - 5 3 mmol/L    Chloride 105 100 - 108 mmol/L    CO2 24 21 - 32 mmol/L    ANION GAP 8 4 - 13 mmol/L    BUN 15 5 - 25 mg/dL    Creatinine 0 81 0 60 - 1 30 mg/dL    Glucose 90 65 - 140 mg/dL    Glucose, Fasting 90 65 - 99 mg/dL    Calcium 8 6 8 3 - 10 1 mg/dL    AST 14 5 - 45 U/L    ALT 32 12 - 78 U/L    Alkaline Phosphatase 60 46 - 116 U/L    Total Protein 7 8 6 4 - 8 2 g/dL    Albumin 3 5 3 5 - 5 0 g/dL    Total Bilirubin 0 30 0 20 - 1 00 mg/dL    eGFR     Lipid panel    Collection Time: 03/18/22  6:25 AM   Result Value Ref Range    Cholesterol 163 See Comment mg/dL    Triglycerides 113 See Comment mg/dL    HDL, Direct 48 mg/dL    LDL Calculated 92 0 - 100 mg/dL    Non-HDL-Chol (CHOL-HDL) 115 mg/dl   RPR    Collection Time: 03/18/22  6:25 AM   Result Value Ref Range    RPR Non-Reactive Non-Reactive   TSH, 3rd generation with Free T4 reflex    Collection Time: 03/18/22  6:25 AM   Result Value Ref Range    TSH 3RD GENERATON 3 691 0 358 - 3 740 uIU/mL   Hemoglobin A1C    Collection Time: 03/18/22  6:26 AM   Result Value Ref Range    Hemoglobin A1C 5 3 Normal 3 8-5 6%; PreDiabetic 5 7-6 4%;  Diabetic >=6 5%; Glycemic control for adults with diabetes <7 0% %     mg/dl   ECG 12 lead    Collection Time: 03/18/22  6:42 AM   Result Value Ref Range    Ventricular Rate 71 BPM    Atrial Rate 71 BPM    VT Interval 130 ms    QRSD Interval 92 ms    QT Interval 412 ms    QTC Interval 447 ms    P Axis 60 degrees    QRS Axis 55 degrees    T Wave Axis 34 degrees        Samreen Wolfe DO

## 2022-03-19 NOTE — NURSING NOTE
Pt seclusive to room, declined breakfast   Reports difficulty falling asleep last night and feeling tired this morning  Focused on discharge, was hoping to leave this weekend  Expresses understanding that earliest discharge would be Monday  Pt indicates being here for resources only and that the environment is not therapeutic to pt  Pt refers to self as a "loner" and disinterested in group participation  RN again reviewed the group schedule and encourage attendance  Pt denies SI or thoughts of self harm  Utilizing radio and has books at bedside

## 2022-03-19 NOTE — NURSING NOTE
Patient requested information r/t diagnosis and medications  Patient given information from discharge folder r/t depression and anxiety, as well as for prescribed medications of Lexapro and Seroquel  Patient states, "I was just curious if I had an official diagnosis of Dissociative Identity Disorder  I've done independent research and I know that I have it " Patient encouraged to speak with physician r/t her suspected diagnosis and patient is agreeable  Patient encouraged to seek staff with any issues and/or concerns

## 2022-03-20 PROCEDURE — 99232 SBSQ HOSP IP/OBS MODERATE 35: CPT | Performed by: PSYCHIATRY & NEUROLOGY

## 2022-03-20 RX ORDER — QUETIAPINE FUMARATE 100 MG/1
100 TABLET, FILM COATED ORAL
Status: DISCONTINUED | OUTPATIENT
Start: 2022-03-20 | End: 2022-03-22 | Stop reason: HOSPADM

## 2022-03-20 RX ADMIN — SPIRONOLACTONE 50 MG: 25 TABLET ORAL at 09:10

## 2022-03-20 RX ADMIN — ESCITALOPRAM OXALATE 10 MG: 10 TABLET ORAL at 09:10

## 2022-03-20 RX ADMIN — PROGESTERONE 100 MG: 100 CAPSULE ORAL at 17:53

## 2022-03-20 RX ADMIN — SPIRONOLACTONE 50 MG: 25 TABLET ORAL at 22:15

## 2022-03-20 RX ADMIN — QUETIAPINE FUMARATE 100 MG: 100 TABLET ORAL at 22:16

## 2022-03-20 NOTE — PROGRESS NOTES
Snacks dropped off by mom    1 bag  4 m&m yogurts  2 fruit cups  4 chocolate snack packs  Box of breakfast bars  2 kind bars  1 beef stick

## 2022-03-20 NOTE — NURSING NOTE
Pt seclusive to their room  OOB for meals, needed to be redirected for taking dinner tray to room, pt was offered and accepted to eat in Day Room due to increased anxiety  Pt reports not eating breakfast and lunch but eating all of dinner this evening  Denies depression, states feeling weird not being depressed anymore  Discussed scheduled medications and potential side effects  Pt hopeful to discharge tomorrow  Laughing and joking during interaction, currently in room, reading  Denies SI, HI, AVH

## 2022-03-20 NOTE — NURSING NOTE
Pt is calm and cooperative during interaction  Reports sleeping well overnight  Reports mood as "not depressed" which pt states feels strange  Denies SI/HI/AVH  Continues to express hope for discharge tomorrow

## 2022-03-20 NOTE — PLAN OF CARE
Problem: SELF HARM/SUICIDALITY  Goal: Will have no self-injury during hospital stay  Description: INTERVENTIONS:  - Q 15 MINUTES: Routine safety checks  - Q WAKING SHIFT & PRN: Assess risk to determine if routine checks are adequate to maintain patient safety  - Encourage patient to participate actively in care by formulating a plan to combat response to suicidal ideation, identify supports and resources  Outcome: Progressing     Problem: DEPRESSION  Goal: Will be euthymic at discharge  Description: INTERVENTIONS:  - Administer medication as ordered  - Provide emotional support via 1:1 interaction with staff  - Encourage involvement in milieu/groups/activities  - Monitor for social isolation  Outcome: Progressing     Problem: ANXIETY  Goal: Will report anxiety at manageable levels  Description: INTERVENTIONS:  - Administer medication as ordered  - Teach and encourage coping skills  - Provide emotional support  - Assess patient/family for anxiety and ability to cope  Outcome: Progressing  Goal: By discharge: Patient will verbalize 2 strategies to deal with anxiety  Description: Interventions:  - Identify any obvious source/trigger to anxiety  - Staff will assist patient in applying identified coping technique/skills  - Encourage attendance of scheduled groups and activities  Outcome: Progressing     Problem: SELF CARE DEFICIT  Goal: Return ADL status to a safe level of function  Description: INTERVENTIONS:  - Administer medication as ordered  - Assess ADL deficits and provide assistive devices as needed  - Obtain PT/OT consults as needed  - Assist and instruct patient to increase activity and self care as tolerated  Outcome: Progressing     Problem: DISCHARGE PLANNING  Goal: Discharge to home or other facility with appropriate resources  Description: INTERVENTIONS:  - Identify barriers to discharge w/patient and caregiver  - Arrange for needed discharge resources and transportation as appropriate  - Identify discharge learning needs (meds, wound care, etc )  - Arrange for interpretive services to assist at discharge as needed  - Refer to Case Management Department for coordinating discharge planning if the patient needs post-hospital services based on physician/advanced practitioner order or complex needs related to functional status, cognitive ability, or social support system  Outcome: Progressing     Problem: Ineffective Coping  Goal: Participates in unit activities  Description: Interventions:  - Provide therapeutic environment   - Provide required programming   - Redirect inappropriate behaviors   Outcome: Progressing

## 2022-03-20 NOTE — PROGRESS NOTES
Progress Note - Behavioral Health   Amadeo Randhawa 25 y o  adult MRN: [id number]  Unit/Bed#: -02 Encounter: [id number]    Assessment/Plan   Principal Problem:    Recurrent major depressive disorder ([address] )  Active Problems:    Medical clearance for psychiatric admission    Suicidal ideation    Transgender    Anxiety    Personality disorder [facility])  Patient does appear brighter and more conversational engaged in interview today with this provider  Found awake and reading on her bed  Continues to wish for discharge but is compliant with her care  Continues to report that inpatient unit is not therapeutic and states she experiences high level of anxiety when thinking about going groups  Denies feeling depressed or happy today  Moderate level of anxiety observed  Patient continues to be seclusive  Does have somewhat irritable edge and is scant  Sleep was improved last night with as needed trazodone  Appetite remains reduced and patient was agreeable to mild increase of Seroquel which will be provided to patient  Will keep Lexapro at same does for mood and anxiety at this time  Patient denies any hallucinogenic material or safety concerning symptoms and does not appear to be responding to internal stimuli  Will continue to monitor  Recommended Treatment:   1) Continue Lexapro 10 mg PO QD for mood and anxiety  2) Increase Seroquel to 100 mg PO QHS for mood stabilization, appetite, and sleep support    Continue with group therapy, milieu therapy and occupational therapy  Continue frequent safety checks and vitals per unit protocol    Case discussed with treatment team   Risks, benefits and possible side effects of Medications: Risks, benefits, and possible side effects of medications have been explained to the patient, who verbalizes understanding    ------------------------------------------------------------    Subjective: Per nursing report, Alise Lab has been cooperative on the unit and compliant with medications  Today, Felix Mom is consenting for safety on the unit  She reports that her current mood is good   patient was reading books that she states were brought in by her fiance  Reports that I am not happy, but I am not depressed today, and just neutral    States that anxiety remains within the moderate range but that it is not bad here though she does dread thought of going to groups  Reports that she is sleeping better but still having some mild trouble with initiation  Reports appetite is reduced and she is only eating dinner for the most part  Patient was agreeable to increase dose of her Seroquel  Denies any side effects to medications or other physical symptoms  Denies SI/HI/AVH  Per nursing staff, patient has been seclusive and states that she does not like crowds  Does appear depressed and scant but overall has been more engaged today  Progress Toward Goals: slow improvement    Psychiatric Review of Systems:  Behavior over the last 24 hours: slow improvement  Sleep: insomnia  Appetite: decreased from baseline  Medication side effects: none verbalized  ROS: Complete review of systems is negative except as noted above      Vital signs in last 24 hours:  Temp:  [98 °F (36 7 °C)-98 9 °F (37 2 °C)] 98 9 °F (37 2 °C)  HR:  [89-97] 97  Resp:  [16-18] 18  BP: (112-127)/(66-75) 112/66    Mental Status Exam:  Appearance:  alert, good eye contact, appears stated age, casually dressed, appropriate grooming and hygiene and wearing glasses   Behavior:  calm, cooperative, laying in bed and reading a Jenna Gregg book   Motor: no abnormal movements, psychomotor retardation and Unable to assess   Speech:  spontaneous, slow, soft and coherent   Mood:  "I'm not depressed, I'm not happy, I'm just neutral"   Affect:  mood-congruent, blunted, depressed, anxious and brighter than previous   Thought Process:  Organized, logical, goal-directed   Thought Content: no verbalized delusions or overt paranoia Perceptual disturbances: no reported hallucinations and does not appear to be responding to internal stimuli at this time   Risk Potential: No active or passive suicidal or homicidal ideation was verbalized during interview, Low potential for aggression based on previous behavior   Cognition: oriented to self and situation, memory grossly intact, appears to be of average intelligence, normal abstract reasoning, attention span appeared shorter than expected for age and cognition not formally tested   Insight:  Improving   Judgment: Fair     Current Medications:  Current Facility-Administered Medications   Medication Dose Route Frequency Provider Last Rate    acetaminophen  650 mg Oral Q6H PRN Corinne Diberville, PA-C      acetaminophen  650 mg Oral Q4H PRN Corinne Abrahan, PA-C      acetaminophen  975 mg Oral Q6H PRN Corinne Abrahan, PA-C      aluminum-magnesium hydroxide-simethicone  30 mL Oral Q4H PRN Corinne Diberville, PA-C      artificial tear  1 application Both Eyes K1W PRN Corinne Diberville, PA-C      benztropine  1 mg Intramuscular Q4H PRN Max 6/day Corinne Diberville, PA-C      benztropine  1 mg Oral Q4H PRN Max 6/day Corinne Diberville, PA-C      bisacodyl  10 mg Rectal Daily PRN Corinne Diberville, PA-C      hydrOXYzine HCL  50 mg Oral Q6H PRN Max 4/day Corinne Diberville, PA-C      Or    diphenhydrAMINE  50 mg Intramuscular Q6H PRN Corinne Abrahan, PA-C      escitalopram  10 mg Oral Daily Alejandro Jarquin MD      hydrOXYzine HCL  100 mg Oral Q6H PRN Max 4/day Corinne Diberville, PA-C      Or    LORazepam  2 mg Intramuscular Q6H PRN Corinne Diberville, PA-C      hydrOXYzine HCL  25 mg Oral Q6H PRN Max 4/day Corinne Abrahan, PA-C      OLANZapine  10 mg Oral Q3H PRN Max 3/day Corinne Diberville, PA-C      Or    OLANZapine  10 mg Intramuscular Q3H PRN Max 3/day Corinne Diberville, PA-C      OLANZapine  5 mg Oral Q3H PRN Max 6/day Corinne Abrahan, PA-C      Or    OLANZapine  5 mg Intramuscular Q3H PRN Max 6/day Voncille Overall, PA-C      OLANZapine  2 5 mg Oral Q3H PRN Max 8/day Voncille Overall, PA-C      patient supplied medication  2 each Sublingual BID Kamilla Simmons MD      polyethylene glycol  17 g Oral Daily PRN Voncille Overall, PA-C      Progesterone  100 mg Oral QPM Voncille Overall, PA-C      QUEtiapine  50 mg Oral HS Kamilla Simmons MD      senna-docusate sodium  1 tablet Oral Daily PRN Voncille Overall, PA-C      spironolactone  50 mg Oral BID Voncille Overall, PA-C      traZODone  50 mg Oral HS PRN Voncille Overall, PA-C         Behavioral Health Medications: all current active meds have been reviewed  Changes as in plan section above  Laboratory results:  I have personally reviewed all pertinent laboratory/tests results  No results found for this or any previous visit (from the past 48 hour(s))       Kia Espinoza DO

## 2022-03-21 PROBLEM — Z00.8 MEDICAL CLEARANCE FOR PSYCHIATRIC ADMISSION: Status: RESOLVED | Noted: 2022-03-18 | Resolved: 2022-03-21

## 2022-03-21 PROBLEM — R45.851 SUICIDAL IDEATION: Status: RESOLVED | Noted: 2022-03-18 | Resolved: 2022-03-21

## 2022-03-21 PROCEDURE — 99232 SBSQ HOSP IP/OBS MODERATE 35: CPT | Performed by: STUDENT IN AN ORGANIZED HEALTH CARE EDUCATION/TRAINING PROGRAM

## 2022-03-21 RX ADMIN — QUETIAPINE FUMARATE 100 MG: 100 TABLET ORAL at 21:27

## 2022-03-21 RX ADMIN — PROGESTERONE 100 MG: 100 CAPSULE ORAL at 17:27

## 2022-03-21 RX ADMIN — ESCITALOPRAM OXALATE 10 MG: 10 TABLET ORAL at 09:26

## 2022-03-21 RX ADMIN — SPIRONOLACTONE 50 MG: 25 TABLET ORAL at 21:27

## 2022-03-21 RX ADMIN — TRAZODONE HYDROCHLORIDE 50 MG: 50 TABLET ORAL at 21:28

## 2022-03-21 RX ADMIN — SPIRONOLACTONE 50 MG: 25 TABLET ORAL at 09:26

## 2022-03-21 NOTE — BH TRANSITION RECORD
Contact Information: If you have any questions, concerns, pended studies, tests and/or procedures, or emergencies regarding your inpatient behavioral health visit  Please contact [facility] behavioral health unit [phone] and ask to speak to a , nurse or physician  A contact is available 24 hours/ 7 days a week at this number  Summary of Procedures Performed During your Stay:  Below is a list of major procedures performed during your hospital stay and a summary of results:  - Cardiac Procedures/Studies: ekg  Pending Studies (From admission, onward)    None        If studies are pending at discharge, follow up with your PCP and/or referring provider

## 2022-03-21 NOTE — PROGRESS NOTES
Progress Note - Behavioral Health Carolina Silver 25 y o  adult MRN: [id number]  Unit/Bed#: [facility] 253-02 Encounter: [id number]    Assessment/Plan   Principal Problem:    Recurrent major depressive disorder ([address] )  Active Problems:    Medical clearance for psychiatric admission    Suicidal ideation    Transgender    Anxiety    Personality disorder [facility])      Subjective: Patient was seen, chart reviewed and case discussed with team  Patient appears seclusive and withdrawn to room  She reports anxiety around people  Reports improvement in mood with fluctuating anxiety  Sleep has improved, woke up once  Seroquel was increased yesterday  Denies any nightmares  Denies any thoughts to hurt self or others  She is compliant with medications  Denies any side effects  She is focused on discharge     Psychiatric Review of Systems:  Behavior over the last 24 hours:  improved  Sleep: normal  Appetite: normal  Medication side effects: No  ROS: no complaints, all others negative    Current Medications:  Current Facility-Administered Medications   Medication Dose Route Frequency    acetaminophen (TYLENOL) tablet 650 mg  650 mg Oral Q6H PRN    acetaminophen (TYLENOL) tablet 650 mg  650 mg Oral Q4H PRN    acetaminophen (TYLENOL) tablet 975 mg  975 mg Oral Q6H PRN    aluminum-magnesium hydroxide-simethicone (MYLANTA) oral suspension 30 mL  30 mL Oral Q4H PRN    artificial tear (LUBRIFRESH P M ) ophthalmic ointment 1 application  1 application Both Eyes I3H PRN    benztropine (COGENTIN) injection 1 mg  1 mg Intramuscular Q4H PRN Max 6/day    benztropine (COGENTIN) tablet 1 mg  1 mg Oral Q4H PRN Max 6/day    bisacodyl (DULCOLAX) rectal suppository 10 mg  10 mg Rectal Daily PRN    hydrOXYzine HCL (ATARAX) tablet 50 mg  50 mg Oral Q6H PRN Max 4/day    Or    diphenhydrAMINE (BENADRYL) injection 50 mg  50 mg Intramuscular Q6H PRN    escitalopram (LEXAPRO) tablet 10 mg  10 mg Oral Daily    hydrOXYzine HCL (ATARAX) tablet 100 mg  100 mg Oral Q6H PRN Max 4/day    Or    LORazepam (ATIVAN) injection 2 mg  2 mg Intramuscular Q6H PRN    hydrOXYzine HCL (ATARAX) tablet 25 mg  25 mg Oral Q6H PRN Max 4/day    OLANZapine (ZyPREXA) tablet 10 mg  10 mg Oral Q3H PRN Max 3/day    Or    OLANZapine (ZyPREXA) IM injection 10 mg  10 mg Intramuscular Q3H PRN Max 3/day    OLANZapine (ZyPREXA) tablet 5 mg  5 mg Oral Q3H PRN Max 6/day    Or    OLANZapine (ZyPREXA) IM injection 5 mg  5 mg Intramuscular Q3H PRN Max 6/day    OLANZapine (ZyPREXA) tablet 2 5 mg  2 5 mg Oral Q3H PRN Max 8/day    patient supplied medication  2 each Sublingual BID    polyethylene glycol (MIRALAX) packet 17 g  17 g Oral Daily PRN    Progesterone CAPS 100 mg  100 mg Oral QPM    QUEtiapine (SEROquel) tablet 100 mg  100 mg Oral HS    senna-docusate sodium (SENOKOT S) 8 6-50 mg per tablet 1 tablet  1 tablet Oral Daily PRN    spironolactone (ALDACTONE) tablet 50 mg  50 mg Oral BID    traZODone (DESYREL) tablet 50 mg  50 mg Oral HS PRN       Behavioral Health Medications: all current active meds have been reviewed  Vitals:  Vitals:    03/21/22 0743   BP: 125/71   Pulse: 97   Resp: 17   Temp: 98 7 °F (37 1 °C)   SpO2:        Laboratory results:  I have personally reviewed all pertinent laboratory/tests results      Mental Status Evaluation:  Appearance:  age appropriate   Behavior:  secclusive   Speech:  soft   Mood:  "I am ok"   Affect:  constricted   Language Appropriate   Thought Process:  goal directed and logical   Thought Content:  normal   Perceptual Disturbances: None   Risk Potential: Suicidal Ideations none, Homicidal Ideations none and Potential for Aggression No   Sensorium:  person, place, time/date, situation, day of week, month of year and year   Cognition:  recent and remote memory grossly intact   Consciousness:  alert and awake    Attention: attention span appeared shorter than expected for age   Insight:  fair   Judgment: fair   Gait/Station: normal gait/station Motor Activity: no abnormal movements     Progress Toward Goals: Progressing    Recommended Treatment: Continue with pharmacotherapy, group therapy, milieu therapy and occupational therapy

## 2022-03-21 NOTE — CASE MANAGEMENT
CM received voicemail from [facility], that they do not have any record of Pt having an appointment on 4/13  CM sent an email, requesting that Pt be scheduled an intake in preparation for her d/c tomorrow  CM met with Pt earlier in the day to review d/c plans for tomorrow  Pt upset that she is not leaving today, but agreed she would let her family know to arrive by 10 AM tomorrow for d/c   CM reviewed that     CM received a phone call from Pt's fiance, Kaitlyn, who expressed concern that Pt is not discharging today  CM reviewed when they spoke on Friday, they had discussed Tues as the earliest d/c date for Pt  CM reviewed that she was waiting to hear back from [facility] about scheduling an intake  Kaitlyn expressed concern that if Pt is not permitted to eat in her room, she will not eat  CM said that there are policies in place to the safety of all the patients  Kaitlyn said that over the weekend Pt has been permitted to eat in her room, & then it changed all the sudden; CM agreed to pass her concern along to nursing  She confirmed she will be transporting Pt home tomorrow & CM agreed for d/c at 9:30 AM       CM received a response from [facility] that they currently have a waiting list for individuals with commercial insurance  CM used google maps to serach distances: [facility] is about 20 mins(9miles) & [facility] is about 22 mins(12miles)  CM discussed these providers with Pt & her fiance  They would like Pt to be placed on the waiting list for [facility] referred to [facility]  CM sent follow-up email to [facility], requesting Pt be placed on their waiting list     CM contacted intake, Oleg Little at [facility] @ [phone] & sent a secure email with Pt's FACEsheet to [email or web address]
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
## 2022-03-21 NOTE — PROGRESS NOTES
Status: Pt seclusive to her room, eating meals in the dayroom; requesting to eat in room due to high anxiety  Pt laughing & joking while talking with staff  Pt denies any SI & expressed she was hopeful to d/c today  Pt appeared to have slept overnight  Medication: Seroquel increased to 100mg HS  D/C: Tuesday / 03/21/22 0750   Team Meeting   Meeting Type Daily Rounds   Team Members Present   Team Members Present Physician;Nurse; Other (Discipline and Name);    Physician Team Member Dr Nelia Franklin / Cecile Brito Team Member Constance Harrison / Harlan Hardwick Management Team Member Monica Benson / Holli Go   Other (Discipline and Name) Debra Fitch (art therapy)   Patient/Family Present   Patient Present No   Patient's Family Present No

## 2022-03-21 NOTE — NURSING NOTE
Pt remains seclusive to room, Denies SI/HI  Initially states having thoughts to self harm when speaking to staff member however upon further conversation with this writer, pt states she doesn't want to harm self however has urges to snap bracelet into arm stating this was her coping skills to help decrease anxiety  States "I dont know if you consider that self harm but I dont because Im not, its a coping skill "  States feeling guilt due to her girlfriend being upset that pt was not discharged  Pt states "My girlfriend and Mom cant sleep well at night when Im not there and my girlfriend saw her ex and I wasn't there to be a support for her so now Im upset with myself " Reassured pt to focus on own care and receiving treatment to improve her mood/anxiety  Pt reports she will speak to staff if having thoughts to self harm herself stating "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

## 2022-03-21 NOTE — DISCHARGE INSTR - OTHER ORDERS
Included in your discharge folder is a return to work note, dated for your return on 3/28 with no limitations  [facility]  24/7: [phone]  [place]  [address]    [facility] has 3 crisis centers:   [place] at [facility] at [facility] services are accessible by phone and through three [facility] hospital Emergency Rooms to individuals, families, and law enforcement  Psychiatric and treatment planning is available, providing for evaluation and disposition and the immediate start of medication and treatment  Substance abuse crisis issues are also addressed  Access to psychiatric consultation is limited to a few hours each weekday  These services are available 24/7 at [facility] at [facility] ([place] and [address], [address]; [phone]) and [facility] at [facility], [address]; [phone]  The most active crisis unit is [facility] at [address], [address] [place] [address]; [phone])  Suicide Prevention Lifeline  (290) 849-TALK or (134) Elisabeth 9973 (397) 757-UNII    Crisis Text Line is free, 24/7 support for those in crisis  Text HOME to 675247 from anywhere in the [place] to text with a trained Crisis Counselor  Peer Recovery Warmline through [facility], hours of operation are 1:00 PM to 5:00 PM, Monday through Friday, except major holidays  [address] [phone] ·   [address]  [place], [place], [facility]  Provides free, anonymous and confidential telephone helpline services to help with severe mental health issues to financial concerns, family issues or simply a desire to be heard        What is the WarmLine? The [facility] is a telephone-based, non-crisis support system for anyone struggling with mental health and substance abuse issues  The staff providing the services has been through a similar journey, either as a person receiving mental health or substance abuse services, or as a family member of an individual receiving these services  All calls and communications are kept confidential and callers provide information based on their level of comfort  Our WarmLine Responders are trained to actively listen, empathize, and provide resources only when requested  Using a strengths-based model, WarmLine Responders help to empower callers and equip them with the tools to do their own work  How Much Does it Cost?  Nothing  Everything we do at [facility] is provided to you free of charge  How Do I Reach the WarmLine? Call [phone] and press 2 to enter the [facility] call queue  There may be a brief hold until a WarmLine Responder becomes available to answer your call  How Long Can I Talk? We expect the duration of an average call to be 15 to 20 minutes  If you think you'll need more time, please let the WarmLine responder know (if you're able to)  WarmLine Responders will try their best not to end a call before you receive the support you need

## 2022-03-21 NOTE — DISCHARGE INSTR - APPOINTMENTS
Mica Cain RN, our [employer], will be calling you after your discharge, on the phone number that you provided  She will be available as an additional support, if needed  If you wish to speak with her, you may contact Santos Short at [phone]        You requested to be added to the waiting list at [place] [address]; a referral was done on 3/21/2022 & you are on their waiting list   They are located at [address] [place] [address], [address] [place] [address] [phone] if the number for client registration, if you need to call & check on the status of the waiting list

## 2022-03-22 VITALS
OXYGEN SATURATION: 99 % | DIASTOLIC BLOOD PRESSURE: 82 MMHG | WEIGHT: 253.4 LBS | BODY MASS INDEX: 35.48 KG/M2 | HEIGHT: 71 IN | SYSTOLIC BLOOD PRESSURE: 128 MMHG | RESPIRATION RATE: 16 BRPM | HEART RATE: 75 BPM | TEMPERATURE: 97.5 F

## 2022-03-22 PROCEDURE — 99238 HOSP IP/OBS DSCHRG MGMT 30/<: CPT | Performed by: STUDENT IN AN ORGANIZED HEALTH CARE EDUCATION/TRAINING PROGRAM

## 2022-03-22 RX ORDER — ESCITALOPRAM OXALATE 10 MG/1
10 TABLET ORAL DAILY
Qty: 30 TABLET | Refills: 1 | Status: SHIPPED | OUTPATIENT
Start: 2022-03-22 | End: 2022-05-21

## 2022-03-22 RX ORDER — HYDROXYZINE 50 MG/1
50 TABLET, FILM COATED ORAL EVERY 6 HOURS PRN
Qty: 30 TABLET | Refills: 0 | Status: SHIPPED | OUTPATIENT
Start: 2022-03-22

## 2022-03-22 RX ORDER — QUETIAPINE FUMARATE 100 MG/1
100 TABLET, FILM COATED ORAL
Qty: 30 TABLET | Refills: 1 | Status: SHIPPED | OUTPATIENT
Start: 2022-03-22 | End: 2022-05-21

## 2022-03-22 NOTE — NURSING NOTE
Pt expressed readiness for discharge, AVS reviewed and the patient denied any questions or concerns  Reports feeling safe and able to use coping skills upon departure from this unit  Patient walked out of facility safely by staff with belongings

## 2022-03-22 NOTE — CASE MANAGEMENT
CM met with Pt who verbalized readiness for discharge  CM reviewed that she was waiting to hear back from [facility] & would call Pt when she gets a response  CM & Pt discussed her return to work, & she reported she felt she needed a few days at home to adjust, & would like to return on Monday; note provided in discharge folder  Pt had no questions for CM about her d/c plans

## 2022-03-22 NOTE — PROGRESS NOTES
Status: Pt staff splitting, stating she was permitted to eat in her room in the morning, however, staff that worked over the weekend confirmed she had not been permitted to do so  Pt reported anxiety, snapping her hair tie around her wrist   Pt seclusive to her room  Medication: no changes / PRN - Trazodone  D/C: Today - Pt's fiance to provide transportation at 9:30 AM / Pt wanted referred to [address] she is on the waiting list   She then requested to be referred to [facility], however, CM is waiting for a call back from their intake dpet  03/22/22 0750   Team Meeting   Meeting Type Daily Rounds   Team Members Present   Team Members Present Physician;Nurse;; Other (Discipline and Name)   Physician Team Member Dr Paresh Sarmiento / Laine Bledsoe Team Member ASHLY [facility] Management Team Member MINA VARGAS  [facility]   Other (Discipline and Name) Félix Gilmore (art therapy)   Patient/Family Present   Patient Present No   Patient's Family Present No

## 2022-03-22 NOTE — NURSING NOTE
Pt is cooperative and appropriate during interaction  Denies any thoughts of self harm  Reports difficulty falling asleep d/t anticipating discharge  Reports missing her mother and SO  Looking forward to going home  Denies any questions or concerns

## 2022-03-22 NOTE — PLAN OF CARE
Problem: SELF HARM/SUICIDALITY  Goal: Will have no self-injury during hospital stay  Description: INTERVENTIONS:  - Q 15 MINUTES: Routine safety checks  - Q WAKING SHIFT & PRN: Assess risk to determine if routine checks are adequate to maintain patient safety  - Encourage patient to participate actively in care by formulating a plan to combat response to suicidal ideation, identify supports and resources  Outcome: Adequate for Discharge     Problem: DEPRESSION  Goal: Will be euthymic at discharge  Description: INTERVENTIONS:  - Administer medication as ordered  - Provide emotional support via 1:1 interaction with staff  - Encourage involvement in milieu/groups/activities  - Monitor for social isolation  Outcome: Adequate for Discharge     Problem: ANXIETY  Goal: Will report anxiety at manageable levels  Description: INTERVENTIONS:  - Administer medication as ordered  - Teach and encourage coping skills  - Provide emotional support  - Assess patient/family for anxiety and ability to cope  Outcome: Adequate for Discharge  Goal: By discharge: Patient will verbalize 2 strategies to deal with anxiety  Description: Interventions:  - Identify any obvious source/trigger to anxiety  - Staff will assist patient in applying identified coping technique/skills  - Encourage attendance of scheduled groups and activities  Outcome: Adequate for Discharge     Problem: SELF CARE DEFICIT  Goal: Return ADL status to a safe level of function  Description: INTERVENTIONS:  - Administer medication as ordered  - Assess ADL deficits and provide assistive devices as needed  - Obtain PT/OT consults as needed  - Assist and instruct patient to increase activity and self care as tolerated  Outcome: Adequate for Discharge     Problem: DISCHARGE PLANNING  Goal: Discharge to home or other facility with appropriate resources  Description: INTERVENTIONS:  - Identify barriers to discharge w/patient and caregiver  - Arrange for needed discharge resources and transportation as appropriate  - Identify discharge learning needs (meds, wound care, etc )  - Arrange for interpretive services to assist at discharge as needed  - Refer to Case Management Department for coordinating discharge planning if the patient needs post-hospital services based on physician/advanced practitioner order or complex needs related to functional status, cognitive ability, or social support system  Outcome: Adequate for Discharge     Problem: Ineffective Coping  Goal: Participates in unit activities  Description: Interventions:  - Provide therapeutic environment   - Provide required programming   - Redirect inappropriate behaviors   Outcome: Adequate for Discharge

## 2023-08-07 NOTE — NURSING NOTE
Pt was seclusive to room throughout evening, appeared to nap briefly  Pleasant during interaction with staff  Reported a "rough day" when referring to earlier part of day  Stated "I got my hopes up about discharging today, even though no one told me I was going  That's never a good thing " States they are leaving tomorrow at 0930 and SO, Kaitlyn will be picking them up  Denies SI, HI, AVH, improvement in mood  Feels good on medication, states having upcoming therapy appointment  110